# Patient Record
Sex: MALE | Race: WHITE | NOT HISPANIC OR LATINO | Employment: FULL TIME | URBAN - NONMETROPOLITAN AREA
[De-identification: names, ages, dates, MRNs, and addresses within clinical notes are randomized per-mention and may not be internally consistent; named-entity substitution may affect disease eponyms.]

---

## 2017-11-14 ENCOUNTER — HISTORY (OUTPATIENT)
Dept: EMERGENCY MEDICINE | Facility: OTHER | Age: 22
End: 2017-11-14

## 2017-12-06 ENCOUNTER — HISTORY (OUTPATIENT)
Dept: EMERGENCY MEDICINE | Facility: OTHER | Age: 22
End: 2017-12-06

## 2017-12-14 ENCOUNTER — HISTORY (OUTPATIENT)
Dept: EMERGENCY MEDICINE | Facility: OTHER | Age: 22
End: 2017-12-14

## 2018-03-01 ENCOUNTER — DOCUMENTATION ONLY (OUTPATIENT)
Dept: FAMILY MEDICINE | Facility: OTHER | Age: 23
End: 2018-03-01

## 2018-03-01 RX ORDER — POLYMYXIN B SULFATE AND TRIMETHOPRIM 1; 10000 MG/ML; [USP'U]/ML
1 SOLUTION OPHTHALMIC EVERY 4 HOURS
COMMUNITY
Start: 2017-12-14 | End: 2018-09-26

## 2018-09-26 ENCOUNTER — OFFICE VISIT (OUTPATIENT)
Dept: FAMILY MEDICINE | Facility: OTHER | Age: 23
End: 2018-09-26
Attending: FAMILY MEDICINE
Payer: COMMERCIAL

## 2018-09-26 VITALS
DIASTOLIC BLOOD PRESSURE: 70 MMHG | HEART RATE: 60 BPM | HEIGHT: 71 IN | SYSTOLIC BLOOD PRESSURE: 132 MMHG | BODY MASS INDEX: 36.26 KG/M2 | RESPIRATION RATE: 16 BRPM | WEIGHT: 259 LBS

## 2018-09-26 DIAGNOSIS — N52.8 OTHER MALE ERECTILE DYSFUNCTION: ICD-10-CM

## 2018-09-26 DIAGNOSIS — F32.0 MAJOR DEPRESSIVE DISORDER, SINGLE EPISODE, MILD (H): ICD-10-CM

## 2018-09-26 DIAGNOSIS — Z00.00 ROUTINE GENERAL MEDICAL EXAMINATION AT A HEALTH CARE FACILITY: Primary | ICD-10-CM

## 2018-09-26 DIAGNOSIS — F41.1 GAD (GENERALIZED ANXIETY DISORDER): ICD-10-CM

## 2018-09-26 DIAGNOSIS — F17.200 TOBACCO USE DISORDER: ICD-10-CM

## 2018-09-26 LAB
CHOLEST SERPL-MCNC: 161 MG/DL
HDLC SERPL-MCNC: 30 MG/DL (ref 23–92)
LDLC SERPL CALC-MCNC: 109 MG/DL
NONHDLC SERPL-MCNC: 131 MG/DL
TESTOST SERPL-MCNC: 379 NG/DL (ref 175–781)
TRIGL SERPL-MCNC: 108 MG/DL

## 2018-09-26 PROCEDURE — 84403 ASSAY OF TOTAL TESTOSTERONE: CPT | Performed by: FAMILY MEDICINE

## 2018-09-26 PROCEDURE — 90686 IIV4 VACC NO PRSV 0.5 ML IM: CPT | Performed by: FAMILY MEDICINE

## 2018-09-26 PROCEDURE — 80061 LIPID PANEL: CPT | Performed by: FAMILY MEDICINE

## 2018-09-26 PROCEDURE — 36415 COLL VENOUS BLD VENIPUNCTURE: CPT | Performed by: FAMILY MEDICINE

## 2018-09-26 PROCEDURE — 99395 PREV VISIT EST AGE 18-39: CPT | Mod: 25 | Performed by: FAMILY MEDICINE

## 2018-09-26 PROCEDURE — 90471 IMMUNIZATION ADMIN: CPT | Performed by: FAMILY MEDICINE

## 2018-09-26 RX ORDER — VARENICLINE TARTRATE 1 MG/1
1 TABLET, FILM COATED ORAL 2 TIMES DAILY
Qty: 56 TABLET | Refills: 2 | Status: SHIPPED | OUTPATIENT
Start: 2018-09-26 | End: 2019-02-08

## 2018-09-26 ASSESSMENT — ANXIETY QUESTIONNAIRES
5. BEING SO RESTLESS THAT IT IS HARD TO SIT STILL: MORE THAN HALF THE DAYS
7. FEELING AFRAID AS IF SOMETHING AWFUL MIGHT HAPPEN: SEVERAL DAYS
6. BECOMING EASILY ANNOYED OR IRRITABLE: SEVERAL DAYS
2. NOT BEING ABLE TO STOP OR CONTROL WORRYING: SEVERAL DAYS
1. FEELING NERVOUS, ANXIOUS, OR ON EDGE: MORE THAN HALF THE DAYS
3. WORRYING TOO MUCH ABOUT DIFFERENT THINGS: SEVERAL DAYS
GAD7 TOTAL SCORE: 10
IF YOU CHECKED OFF ANY PROBLEMS ON THIS QUESTIONNAIRE, HOW DIFFICULT HAVE THESE PROBLEMS MADE IT FOR YOU TO DO YOUR WORK, TAKE CARE OF THINGS AT HOME, OR GET ALONG WITH OTHER PEOPLE: SOMEWHAT DIFFICULT

## 2018-09-26 ASSESSMENT — PAIN SCALES - GENERAL: PAINLEVEL: NO PAIN (0)

## 2018-09-26 ASSESSMENT — PATIENT HEALTH QUESTIONNAIRE - PHQ9: 5. POOR APPETITE OR OVEREATING: MORE THAN HALF THE DAYS

## 2018-09-26 NOTE — MR AVS SNAPSHOT
After Visit Summary   9/26/2018    Earnest Richardson    MRN: [id number]           Patient Information     Date Of Birth          1995        Visit Information        Provider Department      9/26/2018 8:45 AM Chadwick Barron MD [facility]        Today's Diagnoses     Routine general medical examination at a health care facility    -  1    Other male erectile dysfunction        NOAM (generalized anxiety disorder)        Major depressive disorder, single episode, mild (H)        Tobacco use disorder          Care Instructions      Preventive Health Recommendations  Male Ages 21 - 25     Yearly exam:             See your health care provider every year in order to  o   Review health changes.   o   Discuss preventive care.    o   Review your medicines if your doctor has prescribed any.    You should be tested each year for STDs (sexually transmitted diseases).     Talk to your provider about cholesterol testing.      If you are at risk for diabetes, you should have a diabetes test (fasting glucose).    Shots: Get a flu shot each year. Get a tetanus shot every 10 years.     Nutrition:    Eat at least 5 servings of fruits and vegetables daily.     Eat whole-grain bread, whole-wheat pasta and brown rice instead of white grains and rice.     Get adequate calcium and Vitamin D.     Lifestyle    Exercise for at least 150 minutes a week (30 minutes a day, 5 days a week). This will help you control your weight and prevent disease.     Limit alcohol to one drink per day.     No smoking.     Wear sunscreen to prevent skin cancer.     See your dentist every six months for an exam and cleaning.     [place] counselors/therapists   Telephone Hours Kids? Address   [facility]  (Many counselors) [phone] - 8-5  F 8-12 Yes [address]   [email or web address]   [facility]  (Many counselors) [phone]  Yes [address] [address]    [email or web address]   [facility]  (Many counselors) [phone]  Yes [address]  [email or web address]   [facility]  Martintoby Ca [phone]  Yes [address]   [address]  [email or web address]   [facility]  David Maegan [phone]  Yes [address]   Grisel Cifuentes [phone]   [address]  [email or web address]   Stormy Borrero [phone]   [address]   Estela Justin [phone]   [address]   Sharon Gan [phone]  Yes [address]   Rosalba Clifford [phone]   [address]    David Lee [phone]   [address]   Laly Ochoa [phone]   10   [address] [place] [address]   [email or web address]   Ashia Smith [phone]   [address] [place] [address] [address]  ([facility])  [email or web address]   [facility]  Placido Hernandez [phone]   [address] [facility]  Michele Rinne Cindy Thomas [phone]      [facility]  Ravi Malik [phone]  Yes [address] [place] [address], [address]  [email or web address]   [facility]  Francisco Churchill CNP [phone] M-F 8-5 Yes [address] [place], [place]  [email or web address]   [facility] [place] [phone]  Yes ESSIE Mcintyre  [address]  [email or web address]   [facility] [place] [phone]  Yes ESSIE Treviño  [address] [facility]  [email or web address]               Follow-ups after your visit        Future tests that were ordered for you today     Open Future Orders        Priority Expected Expires Ordered    Lipid Panel Routine  9/26/2019 9/26/2018    Testosterone, Total Routine  9/26/2019 9/26/2018            Who to contact     If you have questions or need follow up information about today's clinic visit or your schedule please contact [facility] directly at [phone].  Normal or non-critical lab and imaging results will be communicated to you by MyChart, letter or phone within 4 business days after the clinic has received the results. If you do not hear from us within 7 days, please contact the clinic through MyChart or phone. If you have a critical or abnormal lab result, we will notify you by phone as soon as possible.  Submit refill requests through [employer] or call your pharmacy and they will forward the refill request to us. Please allow 3 business days for your refill to be completed.          Additional Information About Your Visit        Care EveryWhere ID     This is your Care EveryWhere ID. This could be used by other organizations to access your [facility] medical records  [id number]        Your Vitals Were     Pulse Respirations Height BMI (Body Mass Index)          60 16 5' 11\" (1.803 m) 36.12 kg/m2         Blood Pressure from Last 3 Encounters:   09/26/18 132/70    Weight from Last 3 Encounters:   09/26/18 259 lb (117.5 kg)              We Performed the Following     [facility]   FLU VAC PRESRV FREE QUAD SPLIT VIR 3+YRS IM          Today's Medication Changes          These changes are accurate as of 9/26/18  9:34 AM.  If you have any questions, ask your nurse or doctor.               Start taking these medicines.        Dose/Directions    * varenicline 0.5 MG X 11 & 1 MG X 42 tablet   Commonly known as:  CHANTIX STARTING MONTH FELICIANO   Used for:  Tobacco use disorder   Started by:  Chadwick Barron MD        Take 0.5 mg tab daily for 3 days, then 0.5 mg tab twice daily for 4 days, then 1 mg twice daily.   Quantity:  53 tablet   Refills:  0       * varenicline 1 MG tablet   Commonly known as:  CHANTIX   Used for:  Tobacco use disorder   Started by:  Chadwick Barron MD        Dose:  1 mg   Take 1 tablet (1 mg) by mouth 2 times daily   Quantity:  56 tablet   Refills:  2       * Notice:  This list has 2 medication(s) that are the " same as other medications prescribed for you. Read the directions carefully, and ask your doctor or other care provider to review them with you.         Where to get your medicines      These medications were sent to [id number] - [place], [place] - [address] OF  [address], [facility] [address]     Phone:  [phone]     varenicline 0.5 MG X 11 & 1 MG X 42 tablet    varenicline 1 MG tablet                Primary Care Provider Fax #    Physician No Ref-Primary [phone]       No address on file        Equal Access to Services     [facility]: Hadii aad ku hadasho Soomaali, waaxda luqadaha, qaybta kaalmada aderoberto, kuldeep madrigal . So [facility] [phone].    ATENCIÓN: Si habla español, tiene a collins disposición servicios gratuitos de asistencia lingüística. [facility] [phone].    We comply with applicable federal civil rights laws and [place] laws. We do not discriminate on the basis of race, color, national origin, age, disability, sex, sexual orientation, or gender identity.            Thank you!     Thank you for choosing [facility]  for your care. Our goal is always to provide you with excellent care. Hearing back from our patients is one way we can continue to improve our services. Please take a few minutes to complete the written survey that you may receive in the mail after your visit with us. Thank you!             Your Updated Medication List - Protect others around you: Learn how to safely use, store and throw away your medicines at www.disposemymeds.org.          This list is accurate as of 9/26/18  9:34 AM.  Always use your most recent med list.                   Brand Name Dispense Instructions for use Diagnosis    * varenicline 0.5 MG X 11 & 1 MG X 42 tablet    CHANTIX STARTING MONTH FELICIANO    53 tablet    Take 0.5 mg tab daily for 3 days, then 0.5 mg tab twice daily for 4 days, then 1 mg twice daily.     Tobacco use disorder       * varenicline 1 MG tablet    CHANTIX    56 tablet    Take 1 tablet (1 mg) by mouth 2 times daily    Tobacco use disorder       * Notice:  This list has 2 medication(s) that are the same as other medications prescribed for you. Read the directions carefully, and ask your doctor or other care provider to review them with you.

## 2018-09-26 NOTE — PATIENT INSTRUCTIONS
Preventive Health Recommendations  Male Ages 21 - 25     Yearly exam:             See your health care provider every year in order to  o   Review health changes.   o   Discuss preventive care.    o   Review your medicines if your doctor has prescribed any.    You should be tested each year for STDs (sexually transmitted diseases).     Talk to your provider about cholesterol testing.      If you are at risk for diabetes, you should have a diabetes test (fasting glucose).    Shots: Get a flu shot each year. Get a tetanus shot every 10 years.     Nutrition:    Eat at least 5 servings of fruits and vegetables daily.     Eat whole-grain bread, whole-wheat pasta and brown rice instead of white grains and rice.     Get adequate calcium and Vitamin D.     Lifestyle    Exercise for at least 150 minutes a week (30 minutes a day, 5 days a week). This will help you control your weight and prevent disease.     Limit alcohol to one drink per day.     No smoking.     Wear sunscreen to prevent skin cancer.     See your dentist every six months for an exam and cleaning.     [place] counselors/therapists   Telephone Hours Kids? Address   [facility]  (Many counselors) [phone] M-Th 8-5  F 8-12 Yes [address] [place] [address]   [email or web address]   [facility]  (Many counselors) [phone]  Yes [address]   [email or web address]://[email or web address]   [facility]  (Many counselors) [phone]  Yes [facility] [place]  [email or web address]://[email or web address]   [facility]  Martintoby Ca [phone]  Yes [address]   [address]  [email or web address]   [facility]  David Issa [phone]  Yes [address]   Grisel Cifuentes [phone]   [address]  [email or web address]   Stormy Borrero [phone]   [address] [phone]   [address] [place] [address]   Sharon Malik [phone]  Yes [address]    Bibimesfin Darrin [phone]   [address] [facility]   David Howard [phone]   [address] [facility] [address]   Laly Ochoa [phone]   10   [address] [place] [address]   [email or web address]   Ashia Smith [phone]   [address] [place] [address] [address]  ([facility])  [email or web address]   [facility]  Placido Hernandez [phone]   [address] [place] [address]  Michele Rinne Cindy Thomas [phone]      [facility]  Ravi Malik [phone]  Yes [address] [place] [address], [address]  [email or web address]   [facility]  Francisco Churchill CNP [phone] M-F 8-5 Yes [address] [place], [place]  [email or web address]   [facility]: [place] Javier [phone]  Yes ESSIE Mcintyre  [address]  [email or web address]://[email or web address]   [facility] [place] [phone]  Yes ESSIE Treviño  [address]  [email or web address]
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
## 2018-09-26 NOTE — LETTER
September 26, 2018      Earnest Richardson  [address]  [facility] [address]        Dear Earnest,     This all looks normal.    Results for orders placed or performed in visit on 09/26/18   Lipid Panel   Result Value Ref Range    Cholesterol 161 <200 mg/dL    Triglycerides 108 <150 mg/dL    HDL Cholesterol 30 23 - 92 mg/dL    LDL Cholesterol Calculated 109 (H) <100 mg/dL    Non HDL Cholesterol 131 (H) <130 mg/dL   Testosterone, Total   Result Value Ref Range    Testosterone Total 379 175 - 781 ng/dL           Sincerely,        Chadwick Barron MD

## 2018-09-26 NOTE — PROGRESS NOTES
Injectable Influenza Immunization Documentation    1.  Is the person to be vaccinated sick today?   No    2. Does the person to be vaccinated have an allergy to a component   of the vaccine?   No  Egg Allergy Algorithm Link    3. Has the person to be vaccinated ever had a serious reaction   to influenza vaccine in the past?   No    4. Has the person to be vaccinated ever had Guillain-Barré syndrome?   No    Form completed by Lexus Calvert LPN on 9/26/2018 at 9:33 AM  VERIFIED

## 2018-09-27 ASSESSMENT — PATIENT HEALTH QUESTIONNAIRE - PHQ9: SUM OF ALL RESPONSES TO PHQ QUESTIONS 1-9: 12

## 2018-09-27 ASSESSMENT — ANXIETY QUESTIONNAIRES: GAD7 TOTAL SCORE: 10

## 2019-02-08 ENCOUNTER — HOSPITAL ENCOUNTER (OUTPATIENT)
Dept: GENERAL RADIOLOGY | Facility: OTHER | Age: 24
Discharge: HOME | End: 2019-02-08
Attending: NURSE PRACTITIONER | Admitting: NURSE PRACTITIONER
Payer: OTHER MISCELLANEOUS

## 2019-02-08 ENCOUNTER — OFFICE VISIT (OUTPATIENT)
Dept: FAMILY MEDICINE | Facility: OTHER | Age: 24
End: 2019-02-08
Attending: NURSE PRACTITIONER
Payer: OTHER MISCELLANEOUS

## 2019-02-08 VITALS
WEIGHT: 274.25 LBS | BODY MASS INDEX: 38.4 KG/M2 | DIASTOLIC BLOOD PRESSURE: 90 MMHG | HEIGHT: 71 IN | SYSTOLIC BLOOD PRESSURE: 138 MMHG | RESPIRATION RATE: 16 BRPM | HEART RATE: 76 BPM

## 2019-02-08 DIAGNOSIS — S69.92XA INJURY OF FINGER OF LEFT HAND, INITIAL ENCOUNTER: ICD-10-CM

## 2019-02-08 DIAGNOSIS — S62.665B OPEN NONDISPLACED FRACTURE OF DISTAL PHALANX OF LEFT RING FINGER, INITIAL ENCOUNTER: Primary | ICD-10-CM

## 2019-02-08 PROCEDURE — 73140 X-RAY EXAM OF FINGER(S): CPT | Mod: LT

## 2019-02-08 PROCEDURE — 99214 OFFICE O/P EST MOD 30 MIN: CPT | Performed by: NURSE PRACTITIONER

## 2019-02-08 RX ORDER — CEPHALEXIN 500 MG/1
500 CAPSULE ORAL 3 TIMES DAILY
Qty: 30 CAPSULE | Refills: 0 | Status: SHIPPED | OUTPATIENT
Start: 2019-02-08 | End: 2019-03-26

## 2019-02-08 ASSESSMENT — PAIN SCALES - GENERAL: PAINLEVEL: MODERATE PAIN (5)

## 2019-02-08 ASSESSMENT — MIFFLIN-ST. JEOR: SCORE: 2256.12

## 2019-02-08 NOTE — PATIENT INSTRUCTIONS
Keflex 3 times daily x10 days  Ice to finger  Elevation of finger  Ibuprofen or tylenol as needed  Finger splint

## 2019-02-08 NOTE — PROGRESS NOTES
"HPI:    Earnest Richardson is a 23 year old male who presents to clinic today for finger injury. He smashed his left 4th finger last Sunday, 2/3/2019 and again today, 2/8/2019. This happened at work. Reports he hit his finger while breaking tacks off a log last week. Used ice and the pain improved. Today he was lifting a bottom tank and lost his . He tried to set this down and his finger was pinched underneath the tank. This was the left 4th finger. Has had swelling, pain. Placed bandage on and came in to the clinic.       Current Outpatient Medications   Medication Sig Dispense Refill     cephALEXin (KEFLEX) 500 MG capsule Take 1 capsule (500 mg) by mouth 3 times daily for 10 days 30 capsule 0       Allergies   Allergen Reactions     Penicillins Unknown       ROS:  Pertinent positives and negatives are noted in HPI.    EXAM:  /90 (BP Location: Right arm, Patient Position: Sitting, Cuff Size: Adult Regular)   Pulse 76   Resp 16   Ht 1.803 m (5' 11\")   Wt 124.4 kg (274 lb 4 oz)   BMI 38.25 kg/m    General appearance: well appearing female, in no acute distress  Musculoskeletal: left 4th finger with swelling to distal tip. Slightly limited ROM due to swelling.   Dermatological: left 4th finger with small abrasion to palmar side. Nail with subunguial hematoma present. Area is red and swollen   Psychological: normal affect, alert and pleasant  Xray: xray independently reviewed and fracture noted to distal tuft appreciated; pending radiology over-read    PROCEDURE:  XR FINGER LT G/E 2 VW     HISTORY: Injury of finger of left hand, initial encounter.     COMPARISON:  None.     TECHNIQUE:  3 views left fourth digit.     FINDINGS:  The distal tuft of the distal phalanx left fourth digit is  fractured, with mild comminution. No retained foreign body is seen. No  other fracture is identified. The joint spaces are preserved.      ROMEO SILVESTRE MD  ASSESSMENT AND PLAN:    1. Open nondisplaced fracture of distal " phalanx of left ring finger, initial encounter    2. Injury of finger of left hand, initial encounter      Due to abrasion to palmar side of finger this is considered an open fracture. He was placed on keflex preventatively. We discussed relieving the pressure under his nail bed today, he does decline at this time. Plan to treat with finger splint, NSAID's, elevation and ice. He will remain off work until Monday when he has follow-up with Dr Marc. All questions were answered and he is in agreement with plan.         Nori Soria..................2/8/2019 2:53 PM

## 2019-02-08 NOTE — NURSING NOTE
Patient presents to clinic today for left finger injury. He states he first smashed it last Sunday and now again today.     Declines PHQ and NOAM    No LMP for male patient.  Medication Reconciliation: complete    Jojo Beltran LPN  2/8/2019 2:56 PM

## 2019-02-11 ENCOUNTER — OFFICE VISIT (OUTPATIENT)
Dept: FAMILY MEDICINE | Facility: OTHER | Age: 24
End: 2019-02-11
Attending: CHIROPRACTOR
Payer: OTHER MISCELLANEOUS

## 2019-02-11 VITALS
SYSTOLIC BLOOD PRESSURE: 130 MMHG | HEIGHT: 71 IN | RESPIRATION RATE: 12 BRPM | DIASTOLIC BLOOD PRESSURE: 88 MMHG | WEIGHT: 280 LBS | TEMPERATURE: 98.9 F | HEART RATE: 80 BPM | BODY MASS INDEX: 39.2 KG/M2

## 2019-02-11 DIAGNOSIS — S62.665B OPEN NONDISPLACED FRACTURE OF DISTAL PHALANX OF LEFT RING FINGER, INITIAL ENCOUNTER: Primary | ICD-10-CM

## 2019-02-11 PROCEDURE — 99213 OFFICE O/P EST LOW 20 MIN: CPT | Performed by: CHIROPRACTOR

## 2019-02-11 ASSESSMENT — MIFFLIN-ST. JEOR: SCORE: 2282.2

## 2019-02-11 ASSESSMENT — PAIN SCALES - GENERAL: PAINLEVEL: NO PAIN (0)

## 2019-02-11 NOTE — NURSING NOTE
Patient presenting with a work comp injury. He fractured the distal phalanx of the left ring finger on 2/8/19.  No LMP for male patient.  Medication Reconciliation: complete    Review Of Systems  Skin: negative, positive for healing laceration on left ring finger  Eyes: negative  Ears/Nose/Throat: negative  Respiratory: No shortness of breath, dyspnea on exertion, cough, or hemoptysis  Cardiovascular: negative  Gastrointestinal: negative  Genitourinary: negative  Musculoskeletal: positive for  Fracture as above  Neurologic: negative  Psychiatric: negative  Hematologic/Lymphatic/Immunologic: negative  Endocrine: negative  Lakesha Gan LPN 2/11/2019 4:21 PM

## 2019-02-12 NOTE — PROGRESS NOTES
"CHIEF COMPLAINT: Earnest Richardson is a 23 year old  male  Chief Complaint   Patient presents with     Work Comp     left hand finger fracture       HISTORY OF PRESENTING INJURY     This is a new patient being seen for distal tuft fracture of the distal phalanx of the left fourth digit.  This is work related with two noted incidences occurring on February 3, 2019 and then again on February 8, 2019.  Patient sustained a strike/smash injury to the distal phalanx of the left fourth digit while \"breaking tacks off of a log\"and then again on the eighth while pinching his finger underneath a large tank.  He was fitted with full finger splint but has found this to not be wearable while performing his job duties as a .  He therefore is no longer wearing that splint.  He denies any pain today and has no problem performing his work duties other than when he bumps the injured finger and then feels pain.  He is accompanied by his wife today.        PAST MEDICAL HISTORY:  Past Medical History:   Diagnosis Date     NOAM (generalized anxiety disorder) 9/26/2018     Major depressive disorder, single episode, mild (H) 9/26/2018       PAST SURGICAL HISTORY:  Past Surgical History:   Procedure Laterality Date     NO HISTORY OF SURGERY         ALLERGIES:  Allergies   Allergen Reactions     Penicillins Unknown       CURRENT MEDICATIONS:  Current Outpatient Medications   Medication Sig Dispense Refill     cephALEXin (KEFLEX) 500 MG capsule Take 1 capsule (500 mg) by mouth 3 times daily for 10 days 30 capsule 0       SOCIAL HISTORY:  Social History     Socioeconomic History     Marital status: Single     Spouse name: Not on file     Number of children: Not on file     Years of education: Not on file     Highest education level: Not on file   Social Needs     Financial resource strain: Not on file     Food insecurity - worry: Not on file     Food insecurity - inability: Not on file     Transportation needs - medical: Not on file     " "Transportation needs - non-medical: Not on file   Occupational History     Not on file   Tobacco Use     Smoking status: Current Some Day Smoker     Packs/day: 0.50     Smokeless tobacco: Never Used   Substance and Sexual Activity     Alcohol use: Yes     Comment: Alcoholic Drinks/day: rarely     Drug use: Unknown     Types: Other     Comment: Drug use: No     Sexual activity: Not on file   Other Topics Concern     Not on file   Social History Narrative     Not on file       FAMILY HISTORY:  No family history on file.    REVIEW OF SYSTEMS:    Nursing Notes:   Lakesha Gan LPN  2/11/2019  4:55 PM  Signed  Patient presenting with a work comp injury. He fractured the distal phalanx of the left ring finger on 2/8/19.  No LMP for male patient.  Medication Reconciliation: complete    Review Of Systems  Skin: negative, positive for healing laceration on left ring finger  Eyes: negative  Ears/Nose/Throat: negative  Respiratory: No shortness of breath, dyspnea on exertion, cough, or hemoptysis  Cardiovascular: negative  Gastrointestinal: negative  Genitourinary: negative  Musculoskeletal: positive for  Fracture as above  Neurologic: negative  Psychiatric: negative  Hematologic/Lymphatic/Immunologic: negative  Endocrine: negative  Lakesha Gan LPN 2/11/2019 4:21 PM     PHYSICAL EXAM:   /88 (BP Location: Right arm, Patient Position: Sitting, Cuff Size: Adult Large)   Pulse 80   Temp 98.9  F (37.2  C) (Tympanic)   Resp 12   Ht 1.803 m (5' 11\")   Wt 127 kg (280 lb)   BMI 39.05 kg/m   Body mass index is 39.05 kg/m .    General Appearance: No acute distress.    The fourth digit of the left hand shows edema occurring at the DIP joint.  Nailbed protrusion is noticed.  Nonpurulent. He has full range of motion on each of the fingers of the left hand.   strength is unaffected.     Radiographic images were independently reviewed and discussed with the patient.       PROCEDURE:  XR FINGER LT G/E 2 " VW     HISTORY: Injury of finger of left hand, initial encounter.     COMPARISON:  None.     TECHNIQUE:  3 views left fourth digit.     FINDINGS:  The distal tuft of the distal phalanx left fourth digit is  fractured, with mild comminution. No retained foreign body is seen. No  other fracture is identified. The joint spaces are preserved.      ROMEO SILVESTRE MD      IMPRESSION:    Distal tuft fracture of the distal phalanx of the left  digit    PLAN:    Fitted patient with finger open nail stack splint any indicated this felt much better.  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  Encouraged him to keep the splint on to avoid any unnecessary contact that would create pain and/or limit healing.  Also encouraged him to keep the area clean.  Patient denies any work restrictions and we will follow-up with him in 6 weeks with x-ray to ensure proper healing.    Greater than 50% of this encounter was spent in counseling and coordination of care regarding the above condition.        Vadim Marc DC  Director - Occupational Medicine Department  [facility] and [address] [address]  Phone [phone]  Fax [phone]    Disclaimer:  This note consists of words and symbols derived from keyboarding, dictation, or using voice recognition software. As a result, there may be errors in the script that have gone undetected. Please consider this when interpreting information found in this note.    7:36 AM 2/12/2019

## 2019-03-26 ENCOUNTER — OFFICE VISIT (OUTPATIENT)
Dept: FAMILY MEDICINE | Facility: OTHER | Age: 24
End: 2019-03-26
Attending: CHIROPRACTOR
Payer: OTHER MISCELLANEOUS

## 2019-03-26 ENCOUNTER — HOSPITAL ENCOUNTER (OUTPATIENT)
Dept: GENERAL RADIOLOGY | Facility: OTHER | Age: 24
Discharge: HOME | End: 2019-03-26
Attending: CHIROPRACTOR | Admitting: CHIROPRACTOR
Payer: OTHER MISCELLANEOUS

## 2019-03-26 VITALS
WEIGHT: 282.8 LBS | SYSTOLIC BLOOD PRESSURE: 128 MMHG | TEMPERATURE: 97.8 F | HEART RATE: 72 BPM | HEIGHT: 71 IN | RESPIRATION RATE: 16 BRPM | BODY MASS INDEX: 39.59 KG/M2 | DIASTOLIC BLOOD PRESSURE: 88 MMHG

## 2019-03-26 DIAGNOSIS — S62.665B: ICD-10-CM

## 2019-03-26 DIAGNOSIS — S62.665B: Primary | ICD-10-CM

## 2019-03-26 PROCEDURE — 99213 OFFICE O/P EST LOW 20 MIN: CPT | Performed by: CHIROPRACTOR

## 2019-03-26 PROCEDURE — 73140 X-RAY EXAM OF FINGER(S): CPT | Mod: LT

## 2019-03-26 ASSESSMENT — MIFFLIN-ST. JEOR: SCORE: 2294.9

## 2019-03-26 ASSESSMENT — PAIN SCALES - GENERAL: PAINLEVEL: NO PAIN (0)

## 2019-03-26 NOTE — NURSING NOTE
"Patient presents to the clinic today for work comp.  Nina Schuster LPN 3/26/2019   4:41 PM    Chief Complaint   Patient presents with     Work Comp       Initial /88 (BP Location: Right arm, Patient Position: Sitting, Cuff Size: Adult Regular)   Pulse 72   Temp 97.8  F (36.6  C) (Oral)   Resp 16   Ht 1.803 m (5' 11\")   Wt 128.3 kg (282 lb 12.8 oz)   BMI 39.44 kg/m   Estimated body mass index is 39.44 kg/m  as calculated from the following:    Height as of this encounter: 1.803 m (5' 11\").    Weight as of this encounter: 128.3 kg (282 lb 12.8 oz).  Medication Reconciliation: complete     Nina Schuster LPN    "

## 2019-03-27 NOTE — PROGRESS NOTES
CHIEF COMPLAINT: Earnest Richardson is a 23 year old  male  Chief Complaint   Patient presents with     Work Comp       HISTORY OF PRESENTING INJURY     Earnest returns today for re-evaluation of his left 4th digit fracture.  He reports no pain or limitation while performing his work duties.  He has been wearing the protective splint up to lately when he states it no longer fits due to the decrease in finger swelling.  He is interested in having a smaller splint today.  He has no major complaints and thinks his finger has healed appropriately.      PAST MEDICAL HISTORY:  Past Medical History:   Diagnosis Date     NOAM (generalized anxiety disorder) 9/26/2018     Major depressive disorder, single episode, mild (H) 9/26/2018       PAST SURGICAL HISTORY:  Past Surgical History:   Procedure Laterality Date     NO HISTORY OF SURGERY         ALLERGIES:  Allergies   Allergen Reactions     Penicillins Unknown       CURRENT MEDICATIONS:  No current outpatient medications on file.       SOCIAL HISTORY:  Social History     Socioeconomic History     Marital status: Single     Spouse name: Not on file     Number of children: Not on file     Years of education: Not on file     Highest education level: Not on file   Occupational History     Not on file   Social Needs     Financial resource strain: Not on file     Food insecurity:     Worry: Not on file     Inability: Not on file     Transportation needs:     Medical: Not on file     Non-medical: Not on file   Tobacco Use     Smoking status: Current Some Day Smoker     Packs/day: 0.50     Smokeless tobacco: Never Used   Substance and Sexual Activity     Alcohol use: Yes     Comment: Alcoholic Drinks/day: rarely     Drug use: Yes     Types: Marijuana     Sexual activity: Yes     Partners: Female   Lifestyle     Physical activity:     Days per week: Not on file     Minutes per session: Not on file     Stress: Not on file   Relationships     Social connections:     Talks on phone: Not on  "file     Gets together: Not on file     Attends Taoist service: Not on file     Active member of club or organization: Not on file     Attends meetings of clubs or organizations: Not on file     Relationship status: Not on file     Intimate partner violence:     Fear of current or ex partner: Not on file     Emotionally abused: Not on file     Physically abused: Not on file     Forced sexual activity: Not on file   Other Topics Concern     Not on file   Social History Narrative     Not on file       FAMILY HISTORY:  History reviewed. No pertinent family history.    REVIEW OF SYSTEMS:    No change from 2/11/2019 visit    PHYSICAL EXAM:   /88 (BP Location: Right arm, Patient Position: Sitting, Cuff Size: Adult Regular)   Pulse 72   Temp 97.8  F (36.6  C) (Oral)   Resp 16   Ht 1.803 m (5' 11\")   Wt 128.3 kg (282 lb 12.8 oz)   BMI 39.44 kg/m   Body mass index is 39.44 kg/m .    General Appearance: No acute distress.    Updated x-rays were obtained.     XR Finger Left G/E 2 Views   Order: [id number]   Status:  Final result   Visible to patient:  No (Not Released) Dx:  Open nondisplaced fracture of distal ...   Details     Reading Physician Reading Date Result Priority   Romeo Silvestre MD 3/26/2019       Narrative     PROCEDURE:  XR FINGER LT G/E 2 VW    HISTORY: Open nondisplaced fracture of distal phalanx of left ring  finger.    COMPARISON:  2/8/2019    TECHNIQUE:  3 views left fourth finger.    FINDINGS:  The previously described comminuted fracture of the distal  tuft of the left fourth finger is redemonstrated, not significantly  changed in appearance. No other fracture is seen. No retained foreign  body is identified.     ROMEO SILVESTRE MD             Radiographic images were independently reviewed and discussed with the patient.        IMPRESSION:    Unresolved distal tuft fracture of the left 4th digit    PLAN:    (1) follow up with orthopaedic consult   (2) full duty workability " authorized  (3) fitted new stack splint to wear as needed  (4) follow up as needed.    Greater than 50% of this encounter was spent in counseling and coordination of care regarding the above condition.        Vadim Marc DC  Director - Occupational Medicine Department  [address] [address]  Phone [phone]  Fax [phone]    Disclaimer:  This note consists of words and symbols derived from keyboarding, dictation, or using voice recognition software. As a result, there may be errors in the script that have gone undetected. Please consider this when interpreting information found in this note.    9:00 AM 3/27/2019

## 2020-06-23 ENCOUNTER — HOSPITAL ENCOUNTER (EMERGENCY)
Facility: OTHER | Age: 25
Discharge: HOME | End: 2020-06-23
Attending: PHYSICIAN ASSISTANT | Admitting: PHYSICIAN ASSISTANT
Payer: COMMERCIAL

## 2020-06-23 VITALS
OXYGEN SATURATION: 99 % | RESPIRATION RATE: 18 BRPM | WEIGHT: 300 LBS | HEIGHT: 71 IN | DIASTOLIC BLOOD PRESSURE: 96 MMHG | TEMPERATURE: 98.8 F | BODY MASS INDEX: 42 KG/M2 | SYSTOLIC BLOOD PRESSURE: 109 MMHG | HEART RATE: 95 BPM

## 2020-06-23 DIAGNOSIS — L02.31 ABSCESS OF GLUTEAL CLEFT: ICD-10-CM

## 2020-06-23 LAB
GRAM STN SPEC: ABNORMAL
SPECIMEN SOURCE: ABNORMAL

## 2020-06-23 PROCEDURE — 87070 CULTURE OTHR SPECIMN AEROBIC: CPT | Performed by: PHYSICIAN ASSISTANT

## 2020-06-23 PROCEDURE — 10060 I&D ABSCESS SIMPLE/SINGLE: CPT | Performed by: PHYSICIAN ASSISTANT

## 2020-06-23 PROCEDURE — 99283 EMERGENCY DEPT VISIT LOW MDM: CPT | Mod: 25 | Performed by: PHYSICIAN ASSISTANT

## 2020-06-23 PROCEDURE — 87205 SMEAR GRAM STAIN: CPT | Performed by: PHYSICIAN ASSISTANT

## 2020-06-23 PROCEDURE — 10060 I&D ABSCESS SIMPLE/SINGLE: CPT | Mod: Z6 | Performed by: PHYSICIAN ASSISTANT

## 2020-06-23 PROCEDURE — 99283 EMERGENCY DEPT VISIT LOW MDM: CPT | Mod: Z6 | Performed by: PHYSICIAN ASSISTANT

## 2020-06-23 RX ORDER — SULFAMETHOXAZOLE/TRIMETHOPRIM 800-160 MG
1 TABLET ORAL 2 TIMES DAILY
Qty: 20 TABLET | Refills: 0 | Status: SHIPPED | OUTPATIENT
Start: 2020-06-23 | End: 2020-07-20

## 2020-06-23 RX ORDER — LIDOCAINE HYDROCHLORIDE AND EPINEPHRINE 10; 10 MG/ML; UG/ML
20 INJECTION, SOLUTION INFILTRATION; PERINEURAL ONCE
Status: DISCONTINUED | OUTPATIENT
Start: 2020-06-23 | End: 2020-06-23 | Stop reason: HOSPADM

## 2020-06-23 ASSESSMENT — ENCOUNTER SYMPTOMS
CHILLS: 0
HEMATURIA: 0
FEVER: 0
WOUND: 1
CONFUSION: 0
BRUISES/BLEEDS EASILY: 0
ADENOPATHY: 0
COLOR CHANGE: 1
SHORTNESS OF BREATH: 0
ABDOMINAL PAIN: 0
CHEST TIGHTNESS: 0
BACK PAIN: 0

## 2020-06-23 ASSESSMENT — MIFFLIN-ST. JEOR: SCORE: 2362.92

## 2020-06-23 NOTE — ED TRIAGE NOTES
Patient complaining of painful cyst on upper buttock that started on Sunday.  Patient unable to sit comfortably in chair or able to go to work due to pain.

## 2020-06-23 NOTE — ED AVS SNAPSHOT
[facility]  [address]  [place] [address]  Phone:  [phone]  Fax:  [phone]                                    Earnest Richardson   MRN: [id number]    Department:  [facility]   Date of Visit:  6/23/2020           After Visit Summary Signature Page    I have received my discharge instructions, and my questions have been answered. I have discussed any challenges I see with this plan with the nurse or doctor.    ..........................................................................................................................................  Patient/Patient Representative Signature      ..........................................................................................................................................  Patient Representative Print Name and Relationship to Patient    ..................................................               ................................................  Date                                   Time    ..........................................................................................................................................  Reviewed by Signature/Title    ...................................................              ..............................................  Date                                               Time          22EPIC Rev 08/18

## 2020-06-23 NOTE — LETTER
June 23, 2020      To Whom It May Concern:      Earnest Richardson was seen in our Emergency Department today, 06/23/20.  I expect his condition to improve over the next 3 days.  He may return to work when improved.    Sincerely,        DEISY Boswell

## 2020-06-23 NOTE — DISCHARGE INSTRUCTIONS
Get plenty of fluids and rest.  Keep the area clean at least twice per day with soap and water and reapply bandage.  Take your antibiotics as directed.  You can also take Tylenol and ibuprofen to help with discomfort.  Referral has been placed for you to follow-up with surgery for reassessment.  Return to the ED if there are worsening or concerning symptoms.

## 2020-06-23 NOTE — ED PROVIDER NOTES
History      Chief Complaint   Patient presents with     Cyst     HPI  Earnest Richardson is a 25 year old male who presents to the ED for evaluation of a cyst on his buttock. He reports noticing the area approximately 2-3 days ago and it has continued to grow and has become more painful. He has had a similar lesion in the past that seemed to drain and clear up on its own. He denies any fevers, difficulty urinating or with bowel movements. He is otherwise healthy and not immunosuppressed.     Allergies:  Allergies   Allergen Reactions     Penicillins Unknown       Problem List:    Patient Active Problem List    Diagnosis Date Noted     Other male erectile dysfunction 09/26/2018     Priority: Medium     NOAM (generalized anxiety disorder) 09/26/2018     Priority: Medium     Major depressive disorder, single episode, mild (H) 09/26/2018     Priority: Medium     Tobacco use disorder 09/26/2018     Priority: Medium     BMI (body mass index), pediatric, > 99% for age 01/17/2012     Priority: Medium     Headache 01/17/2012     Priority: Medium     Overview:   Updated per 10/1/17 IMO import       Oppositional defiant disorder 05/26/2010     Priority: Medium     Overview:   IMO Update 10/11       Dysthymic disorder 04/20/2010     Priority: Medium     Attention deficit hyperactivity disorder (ADHD) 04/17/2008     Priority: Medium     Overview:   IMO Update 10 2016          Past Medical History:    Past Medical History:   Diagnosis Date     NOAM (generalized anxiety disorder) 9/26/2018     Major depressive disorder, single episode, mild (H) 9/26/2018       Past Surgical History:    Past Surgical History:   Procedure Laterality Date     NO HISTORY OF SURGERY         Family History:    History reviewed. No pertinent family history.    Social History:  Marital Status:  Single [1]  Social History     Tobacco Use     Smoking status: Current Some Day Smoker     Packs/day: 0.50     Smokeless tobacco: Never Used   Substance Use Topics  "    Alcohol use: Yes     Comment: Alcoholic Drinks/day: rarely     Drug use: Yes     Types: Marijuana        Medications:    sulfamethoxazole-trimethoprim (BACTRIM DS) 800-160 MG tablet          Review of Systems   Constitutional: Negative for chills and fever.   HENT: Negative for congestion.    Eyes: Negative for visual disturbance.   Respiratory: Negative for chest tightness and shortness of breath.    Cardiovascular: Negative for chest pain.   Gastrointestinal: Negative for abdominal pain.   Genitourinary: Negative for hematuria.   Musculoskeletal: Negative for back pain.   Skin: Positive for color change and wound. Negative for rash.   Neurological: Negative for syncope.   Hematological: Negative for adenopathy. Does not bruise/bleed easily.   Psychiatric/Behavioral: Negative for confusion.       Physical Exam   BP: (!) 158/98  Pulse: 95  Heart Rate: 91  Temp: 97.5  F (36.4  C)  Resp: 18  Height: 180.3 cm (5' 11\")  Weight: 136.1 kg (300 lb)  SpO2: 97 %      Physical Exam  Constitutional:       General: He is not in acute distress.     Appearance: He is well-developed. He is not diaphoretic.   HENT:      Head: Normocephalic and atraumatic.   Eyes:      General: No scleral icterus.     Conjunctiva/sclera: Conjunctivae normal.   Neck:      Musculoskeletal: Neck supple.   Cardiovascular:      Rate and Rhythm: Normal rate and regular rhythm.   Pulmonary:      Effort: Pulmonary effort is normal.      Breath sounds: Normal breath sounds.   Abdominal:      Palpations: Abdomen is soft.      Tenderness: There is no abdominal tenderness.   Musculoskeletal:         General: No deformity.   Lymphadenopathy:      Cervical: No cervical adenopathy.   Skin:     General: Skin is warm and dry.      Findings: Erythema present. No rash.      Comments: Indurated/fluctuant and erythematous area to right side of superior gluteal cleft. Approximately 5 cm in diameter.   Neurological:      Mental Status: He is alert and oriented to " person, place, and time. Mental status is at baseline.   Psychiatric:         Mood and Affect: Mood normal.         Behavior: Behavior normal.         Thought Content: Thought content normal.         Judgment: Judgment normal.         ED Course        [facility]    PROCEDURE: -Incision/Drainage    Date/Time: 6/23/2020 11:56 AM  Performed by: Anthony Robin PA  Authorized by: Anthony Robin PA       LOCATION:      Type:  Abscess    Size:  5 cm     Location: right superior gluteal cleft.    PRE-PROCEDURE DETAILS:     Skin preparation:  Chloraprep    ANESTHESIA (see MAR for exact dosages):     Anesthesia method:  Local infiltration    Local anesthetic:  Lidocaine 1% WITH epi    PROCEDURE TYPE:     Complexity:  Simple    PROCEDURE DETAILS:     Needle aspiration: no      Incision types:  Single straight    Incision depth:  Subcutaneous    Scalpel blade:  11    Wound management:  Irrigated with saline and probed and deloculated    Drainage:  Bloody and purulent    Drainage amount:  Moderate    Wound treatment:  Wound left open    Packing materials:  None  Post-procedure details:     PROCEDURE   Patient Tolerance:  Patient tolerated the procedure well with no immediate complications                     Critical Care time:  none               No results found for this or any previous visit (from the past 24 hour(s)).    Medications   lidocaine 1% with EPINEPHrine 1:100,000 injection 20 mL (has no administration in time range)       Assessments & Plan (with Medical Decision Making)   Pt nontoxic in NAD. Heart, lung, bowel sounds normal, abd soft, nontender to palpation, nondistended. VSS, afebrile    He does have Indurated/fluctuant and erythematous area to right side of superior gluteal cleft. Approximately 5 cm in diameter.    An I & D was performed, there was a moderate amount of purulent drainage. Wound Culture was obtained. The pt is allergic to penicillins and so we will start him on  Bactrim and he will f/u with surgery in the clinic this week for reassessment.     Strict return precautions are given to the pt, they will return if symptoms are worsening or concerning. The pt understands and agrees with the plan and they are discharged.     Anthony Robin PA-C        I have reviewed the nursing notes.    I have reviewed the findings, diagnosis, plan and need for follow up with the patient.       Discharge Medication List as of 6/23/2020 11:53 AM      START taking these medications    Details   sulfamethoxazole-trimethoprim (BACTRIM DS) 800-160 MG tablet Take 1 tablet by mouth 2 times daily for 10 days, Disp-20 tablet,R-0, E-Prescribe             Final diagnoses:   Abscess of gluteal cleft       6/23/2020   [facility]     Anthony Robin PA  06/23/20 [id number]

## 2020-06-25 LAB
BACTERIA SPEC CULT: NORMAL
SPECIMEN SOURCE: NORMAL

## 2020-06-25 NOTE — RESULT ENCOUNTER NOTE
Final wound culture report is NEGATIVE.    No treatment or change in treatment per [place] ED Lab Result protocol.

## 2020-06-26 ENCOUNTER — OFFICE VISIT (OUTPATIENT)
Dept: SURGERY | Facility: OTHER | Age: 25
End: 2020-06-26
Attending: SURGERY
Payer: COMMERCIAL

## 2020-06-26 VITALS
WEIGHT: 300.8 LBS | BODY MASS INDEX: 41.95 KG/M2 | TEMPERATURE: 97.9 F | SYSTOLIC BLOOD PRESSURE: 128 MMHG | HEART RATE: 88 BPM | DIASTOLIC BLOOD PRESSURE: 90 MMHG | RESPIRATION RATE: 20 BRPM

## 2020-06-26 DIAGNOSIS — L05.91 CHRONIC RECURRENT PILONIDAL CYST WITHOUT ABSCESS: Primary | ICD-10-CM

## 2020-06-26 PROCEDURE — 99204 OFFICE O/P NEW MOD 45 MIN: CPT | Performed by: SURGERY

## 2020-06-26 RX ORDER — ACETAMINOPHEN 325 MG/1
975 TABLET ORAL ONCE
Status: CANCELLED | OUTPATIENT
Start: 2020-06-26 | End: 2020-06-26

## 2020-06-26 RX ORDER — CLINDAMYCIN PHOSPHATE 900 MG/50ML
900 INJECTION, SOLUTION INTRAVENOUS SEE ADMIN INSTRUCTIONS
Status: CANCELLED | OUTPATIENT
Start: 2020-06-26

## 2020-06-26 RX ORDER — CLINDAMYCIN PHOSPHATE 900 MG/50ML
900 INJECTION, SOLUTION INTRAVENOUS
Status: CANCELLED | OUTPATIENT
Start: 2020-06-26

## 2020-06-26 NOTE — H&P (VIEW-ONLY)
GENERAL SURGERY CONSULTATION NOTE    Earnest Richardson   PO   [facility] [address]  25 year old  male    Primary Care Provider:  No Ref-Primary, Physician      HPI: Earnest Richardson presents to clinic with recurrent swelling and infection at the gluteal cleft.  Patient states he recently underwent incision and drainage of pilonidal abscess.  He is now taking Bactrim and states the drainage has dried up and the area feels better.  This happened once before and the area spontaneously drained on its own and he was not seen by medical professional.  However, this time the area did not drain spontaneously and required incision and drainage.  Patient states he has cats at home but otherwise showers regularly.  He does not trim his back hair, or hair on his buttocks.  No previous history of infection anywhere else.  Patient is not immunocompromised.      REVIEW OF SYSTEMS:    GENERAL: No fevers or chills. Denies fatigue, recent weight loss.  HEENT: No sinus drainage. No changes with vision or hearing. No difficulty swallowing.   LYMPHATICS:  Noswollen nodes in axilla, neck or groin.  CARDIOVASCULAR: Denies chest pain, palpitations and dyspnea on exertion.  PULMONARY: No shortness of breath or cough. No increase in sputum production.  GI: Denies melena,bright red blood in stools. No hematemesis. No constipation or diarrhea.  : No dysuria or hematuria.  SKIN: No recent rashes or ulcers.   HEMATOLOGY:  No history of easy bruising or bleeding.  ENDOCRINE:  No history of diabetes or thyroid problems.  NEUROLOGY:  No history of seizures or headaches. No motor or sensory changes.        Patient Active Problem List   Diagnosis     Other male erectile dysfunction     NOAM (generalized anxiety disorder)     Major depressive disorder, single episode, mild (H)     Tobacco use disorder     Attention deficit hyperactivity disorder (ADHD)     BMI (body mass index), pediatric, > 99% for age     Dysthymic disorder     Headache      Oppositional defiant disorder     Chronic recurrent pilonidal cyst without abscess       Past Medical History:   Diagnosis Date     NOAM (generalized anxiety disorder) 9/26/2018     Major depressive disorder, single episode, mild (H) 9/26/2018       Past Surgical History:   Procedure Laterality Date     NO HISTORY OF SURGERY         No family history on file.    Social History     Social History Narrative     Not on file       Social History     Socioeconomic History     Marital status: Single     Spouse name: Not on file     Number of children: Not on file     Years of education: Not on file     Highest education level: Not on file   Occupational History     Not on file   Social Needs     Financial resource strain: Not on file     Food insecurity     Worry: Not on file     Inability: Not on file     Transportation needs     Medical: Not on file     Non-medical: Not on file   Tobacco Use     Smoking status: Current Some Day Smoker     Packs/day: 0.50     Smokeless tobacco: Never Used   Substance and Sexual Activity     Alcohol use: Yes     Comment: Alcoholic Drinks/day: rarely     Drug use: Yes     Types: Marijuana     Sexual activity: Yes     Partners: Female   Lifestyle     Physical activity     Days per week: Not on file     Minutes per session: Not on file     Stress: Not on file   Relationships     Social connections     Talks on phone: Not on file     Gets together: Not on file     Attends Sikhism service: Not on file     Active member of club or organization: Not on file     Attends meetings of clubs or organizations: Not on file     Relationship status: Not on file     Intimate partner violence     Fear of current or ex partner: Not on file     Emotionally abused: Not on file     Physically abused: Not on file     Forced sexual activity: Not on file   Other Topics Concern     Not on file   Social History Narrative     Not on file       sulfamethoxazole-trimethoprim (BACTRIM DS) 800-160 MG tablet, Take 1  tablet by mouth 2 times daily for 10 days    No current facility-administered medications on file prior to visit.         ALLERGIES/SENSITIVITIES:   Allergies   Allergen Reactions     Penicillins Unknown       PHYSICAL EXAM:     BP (!) 128/90 (BP Location: Left arm, Patient Position: Sitting, Cuff Size: Adult Large)   Pulse 88   Temp 97.9  F (36.6  C) (Tympanic)   Resp 20   Wt 136.4 kg (300 lb 12.8 oz)   BMI 41.95 kg/m      General Appearance:   Sitting up in the chair, no apparent distress  HEENT: Pupils are equal and reactive, no scleral icterus,   Heart & CV:  RRR no murmur.  Intact distal pulses, good cap refill.  LUNGS: No increased work of breathing.Lugns are CTA B/L, no wheezing or crackles.  Abd: Soft, nontender, nondistended.  Ext: Small, 0.3 mm punctum at the gluteal cleft.  There is no active drainage or expressible drainage.  There is mild induration in the area but no evidence of residual fluid collection.  No hair sticking out of the wound.  Neuro: Alert and oriented, normal speech mentation        CONSULTATION ASSESSMENT AND PLAN:    25 year old male with chronic, recurrent pilonidal cyst.  The area was just drained and the patient is finishing his course of Bactrim.  I would recommend the patient finish his antibiotics and we excise this area.  The technical details of pilonidal cyst excision were discussed with the patient along with the risk and benefits to include bleeding, infection, cyst recurrence.  The patient demonstrated understanding and is willing to proceed.    Schedule for pilonidal cyst excision on 7/8/2020        Jackson Bernabe MD on 6/26/2020 at 11:44 AM

## 2020-06-26 NOTE — PROGRESS NOTES
GENERAL SURGERY CONSULTATION NOTE    Earnest Richardson   PO   [facility] [address]  25 year old  male    Primary Care Provider:  No Ref-Primary, Physician      HPI: Earnest Richardson presents to clinic with recurrent swelling and infection at the gluteal cleft.  Patient states he recently underwent incision and drainage of pilonidal abscess.  He is now taking Bactrim and states the drainage has dried up and the area feels better.  This happened once before and the area spontaneously drained on its own and he was not seen by medical professional.  However, this time the area did not drain spontaneously and required incision and drainage.  Patient states he has cats at home but otherwise showers regularly.  He does not trim his back hair, or hair on his buttocks.  No previous history of infection anywhere else.  Patient is not immunocompromised.      REVIEW OF SYSTEMS:    GENERAL: No fevers or chills. Denies fatigue, recent weight loss.  HEENT: No sinus drainage. No changes with vision or hearing. No difficulty swallowing.   LYMPHATICS:  Noswollen nodes in axilla, neck or groin.  CARDIOVASCULAR: Denies chest pain, palpitations and dyspnea on exertion.  PULMONARY: No shortness of breath or cough. No increase in sputum production.  GI: Denies melena,bright red blood in stools. No hematemesis. No constipation or diarrhea.  : No dysuria or hematuria.  SKIN: No recent rashes or ulcers.   HEMATOLOGY:  No history of easy bruising or bleeding.  ENDOCRINE:  No history of diabetes or thyroid problems.  NEUROLOGY:  No history of seizures or headaches. No motor or sensory changes.        Patient Active Problem List   Diagnosis     Other male erectile dysfunction     NOAM (generalized anxiety disorder)     Major depressive disorder, single episode, mild (H)     Tobacco use disorder     Attention deficit hyperactivity disorder (ADHD)     BMI (body mass index), pediatric, > 99% for age     Dysthymic disorder     Headache      Oppositional defiant disorder     Chronic recurrent pilonidal cyst without abscess       Past Medical History:   Diagnosis Date     NOAM (generalized anxiety disorder) 9/26/2018     Major depressive disorder, single episode, mild (H) 9/26/2018       Past Surgical History:   Procedure Laterality Date     NO HISTORY OF SURGERY         No family history on file.    Social History     Social History Narrative     Not on file       Social History     Socioeconomic History     Marital status: Single     Spouse name: Not on file     Number of children: Not on file     Years of education: Not on file     Highest education level: Not on file   Occupational History     Not on file   Social Needs     Financial resource strain: Not on file     Food insecurity     Worry: Not on file     Inability: Not on file     Transportation needs     Medical: Not on file     Non-medical: Not on file   Tobacco Use     Smoking status: Current Some Day Smoker     Packs/day: 0.50     Smokeless tobacco: Never Used   Substance and Sexual Activity     Alcohol use: Yes     Comment: Alcoholic Drinks/day: rarely     Drug use: Yes     Types: Marijuana     Sexual activity: Yes     Partners: Female   Lifestyle     Physical activity     Days per week: Not on file     Minutes per session: Not on file     Stress: Not on file   Relationships     Social connections     Talks on phone: Not on file     Gets together: Not on file     Attends Sikh service: Not on file     Active member of club or organization: Not on file     Attends meetings of clubs or organizations: Not on file     Relationship status: Not on file     Intimate partner violence     Fear of current or ex partner: Not on file     Emotionally abused: Not on file     Physically abused: Not on file     Forced sexual activity: Not on file   Other Topics Concern     Not on file   Social History Narrative     Not on file       sulfamethoxazole-trimethoprim (BACTRIM DS) 800-160 MG tablet, Take 1  tablet by mouth 2 times daily for 10 days    No current facility-administered medications on file prior to visit.         ALLERGIES/SENSITIVITIES:   Allergies   Allergen Reactions     Penicillins Unknown       PHYSICAL EXAM:     BP (!) 128/90 (BP Location: Left arm, Patient Position: Sitting, Cuff Size: Adult Large)   Pulse 88   Temp 97.9  F (36.6  C) (Tympanic)   Resp 20   Wt 136.4 kg (300 lb 12.8 oz)   BMI 41.95 kg/m      General Appearance:   Sitting up in the chair, no apparent distress  HEENT: Pupils are equal and reactive, no scleral icterus,   Heart & CV:  RRR no murmur.  Intact distal pulses, good cap refill.  LUNGS: No increased work of breathing.Lugns are CTA B/L, no wheezing or crackles.  Abd: Soft, nontender, nondistended.  Ext: Small, 0.3 mm punctum at the gluteal cleft.  There is no active drainage or expressible drainage.  There is mild induration in the area but no evidence of residual fluid collection.  No hair sticking out of the wound.  Neuro: Alert and oriented, normal speech mentation        CONSULTATION ASSESSMENT AND PLAN:    25 year old male with chronic, recurrent pilonidal cyst.  The area was just drained and the patient is finishing his course of Bactrim.  I would recommend the patient finish his antibiotics and we excise this area.  The technical details of pilonidal cyst excision were discussed with the patient along with the risk and benefits to include bleeding, infection, cyst recurrence.  The patient demonstrated understanding and is willing to proceed.    Schedule for pilonidal cyst excision on 7/8/2020        Jackson Bernabe MD on 6/26/2020 at 11:44 AM

## 2020-06-26 NOTE — NURSING NOTE
"Chief Complaint   Patient presents with     Derm Problem     pilonidal cyst       Initial BP (!) 128/90 (BP Location: Left arm, Patient Position: Sitting, Cuff Size: Adult Large)   Pulse 88   Temp 97.9  F (36.6  C) (Tympanic)   Resp 20   Wt 136.4 kg (300 lb 12.8 oz)   BMI 41.95 kg/m   Estimated body mass index is 41.95 kg/m  as calculated from the following:    Height as of 6/23/20: 1.803 m (5' 11\").    Weight as of this encounter: 136.4 kg (300 lb 12.8 oz).  Medication Reconciliation: complete    Neena Booker LPN  "

## 2020-06-30 DIAGNOSIS — Z01.818 PRE-OP TESTING: Primary | ICD-10-CM

## 2020-07-02 LAB
SARS-COV-2 RNA SPEC QL NAA+PROBE: NORMAL
SPECIMEN SOURCE: NORMAL

## 2020-07-05 ENCOUNTER — ALLIED HEALTH/NURSE VISIT (OUTPATIENT)
Dept: FAMILY MEDICINE | Facility: OTHER | Age: 25
End: 2020-07-05
Attending: SURGERY
Payer: COMMERCIAL

## 2020-07-05 DIAGNOSIS — Z29.9 PROPHYLACTIC MEASURE: Primary | ICD-10-CM

## 2020-07-05 PROCEDURE — 99207 ZZC NO CHARGE NURSE ONLY: CPT

## 2020-07-05 PROCEDURE — C9803 HOPD COVID-19 SPEC COLLECT: HCPCS

## 2020-07-05 PROCEDURE — U0003 INFECTIOUS AGENT DETECTION BY NUCLEIC ACID (DNA OR RNA); SEVERE ACUTE RESPIRATORY SYNDROME CORONAVIRUS 2 (SARS-COV-2) (CORONAVIRUS DISEASE [COVID-19]), AMPLIFIED PROBE TECHNIQUE, MAKING USE OF HIGH THROUGHPUT TECHNOLOGIES AS DESCRIBED BY CMS-2020-01-R: HCPCS | Mod: ZL | Performed by: SURGERY

## 2020-07-06 LAB
SARS-COV-2 RNA SPEC QL NAA+PROBE: NOT DETECTED
SPECIMEN SOURCE: NORMAL

## 2020-07-07 ENCOUNTER — ANESTHESIA EVENT (OUTPATIENT)
Dept: SURGERY | Facility: OTHER | Age: 25
End: 2020-07-07
Payer: COMMERCIAL

## 2020-07-08 ENCOUNTER — HOSPITAL ENCOUNTER (OUTPATIENT)
Facility: OTHER | Age: 25
Discharge: HOME | End: 2020-07-08
Attending: SURGERY | Admitting: SURGERY
Payer: COMMERCIAL

## 2020-07-08 ENCOUNTER — ANESTHESIA (OUTPATIENT)
Dept: SURGERY | Facility: OTHER | Age: 25
End: 2020-07-08
Payer: COMMERCIAL

## 2020-07-08 VITALS
TEMPERATURE: 97.7 F | HEART RATE: 96 BPM | OXYGEN SATURATION: 95 % | RESPIRATION RATE: 13 BRPM | DIASTOLIC BLOOD PRESSURE: 72 MMHG | SYSTOLIC BLOOD PRESSURE: 112 MMHG

## 2020-07-08 DIAGNOSIS — L05.91 CHRONIC RECURRENT PILONIDAL CYST WITHOUT ABSCESS: ICD-10-CM

## 2020-07-08 DIAGNOSIS — Z98.890 POSTOPERATIVE STATE: Primary | ICD-10-CM

## 2020-07-08 PROCEDURE — 71000027 ZZH RECOVERY PHASE 2 EACH 15 MINS: Performed by: SURGERY

## 2020-07-08 PROCEDURE — 25000128 H RX IP 250 OP 636: Performed by: NURSE ANESTHETIST, CERTIFIED REGISTERED

## 2020-07-08 PROCEDURE — 25000125 ZZHC RX 250: Performed by: SURGERY

## 2020-07-08 PROCEDURE — 36000050 ZZH SURGERY LEVEL 2 1ST 30 MIN: Performed by: SURGERY

## 2020-07-08 PROCEDURE — 27210794 ZZH OR GENERAL SUPPLY STERILE: Performed by: SURGERY

## 2020-07-08 PROCEDURE — 25000132 ZZH RX MED GY IP 250 OP 250 PS 637: Performed by: SURGERY

## 2020-07-08 PROCEDURE — 25000125 ZZHC RX 250: Performed by: NURSE ANESTHETIST, CERTIFIED REGISTERED

## 2020-07-08 PROCEDURE — 88304 TISSUE EXAM BY PATHOLOGIST: CPT

## 2020-07-08 PROCEDURE — 11770 EXC PILONIDAL CYST SIMPLE: CPT | Performed by: NURSE ANESTHETIST, CERTIFIED REGISTERED

## 2020-07-08 PROCEDURE — 37000009 ZZH ANESTHESIA TECHNICAL FEE, EACH ADDTL 15 MIN: Performed by: SURGERY

## 2020-07-08 PROCEDURE — 25000564 ZZH DESFLURANE, EA 15 MIN: Performed by: SURGERY

## 2020-07-08 PROCEDURE — 37000008 ZZH ANESTHESIA TECHNICAL FEE, 1ST 30 MIN: Performed by: SURGERY

## 2020-07-08 PROCEDURE — 36000052 ZZH SURGERY LEVEL 2 EA 15 ADDTL MIN: Performed by: SURGERY

## 2020-07-08 PROCEDURE — 40000306 ZZH STATISTIC PRE PROC ASSESS II: Performed by: SURGERY

## 2020-07-08 PROCEDURE — 25800030 ZZH RX IP 258 OP 636: Performed by: NURSE ANESTHETIST, CERTIFIED REGISTERED

## 2020-07-08 PROCEDURE — 71000014 ZZH RECOVERY PHASE 1 LEVEL 2 FIRST HR: Performed by: SURGERY

## 2020-07-08 PROCEDURE — 11770 EXC PILONIDAL CYST SIMPLE: CPT | Performed by: SURGERY

## 2020-07-08 RX ORDER — CLINDAMYCIN PHOSPHATE 900 MG/50ML
900 INJECTION, SOLUTION INTRAVENOUS SEE ADMIN INSTRUCTIONS
Status: DISCONTINUED | OUTPATIENT
Start: 2020-07-08 | End: 2020-07-08 | Stop reason: HOSPADM

## 2020-07-08 RX ORDER — ONDANSETRON 4 MG/1
4 TABLET, ORALLY DISINTEGRATING ORAL
Status: DISCONTINUED | OUTPATIENT
Start: 2020-07-08 | End: 2020-07-08 | Stop reason: HOSPADM

## 2020-07-08 RX ORDER — ACETAMINOPHEN 325 MG/1
650 TABLET ORAL
Status: DISCONTINUED | OUTPATIENT
Start: 2020-07-08 | End: 2020-07-08 | Stop reason: HOSPADM

## 2020-07-08 RX ORDER — PROPOFOL 10 MG/ML
INJECTION, EMULSION INTRAVENOUS PRN
Status: DISCONTINUED | OUTPATIENT
Start: 2020-07-08 | End: 2020-07-08

## 2020-07-08 RX ORDER — KETAMINE HYDROCHLORIDE 10 MG/ML
INJECTION INTRAMUSCULAR; INTRAVENOUS PRN
Status: DISCONTINUED | OUTPATIENT
Start: 2020-07-08 | End: 2020-07-08

## 2020-07-08 RX ORDER — ONDANSETRON 2 MG/ML
4 INJECTION INTRAMUSCULAR; INTRAVENOUS EVERY 30 MIN PRN
Status: DISCONTINUED | OUTPATIENT
Start: 2020-07-08 | End: 2020-07-08 | Stop reason: HOSPADM

## 2020-07-08 RX ORDER — BUPIVACAINE HYDROCHLORIDE AND EPINEPHRINE 5; 5 MG/ML; UG/ML
INJECTION, SOLUTION EPIDURAL; INTRACAUDAL; PERINEURAL PRN
Status: DISCONTINUED | OUTPATIENT
Start: 2020-07-08 | End: 2020-07-08 | Stop reason: HOSPADM

## 2020-07-08 RX ORDER — KETOROLAC TROMETHAMINE 30 MG/ML
INJECTION, SOLUTION INTRAMUSCULAR; INTRAVENOUS PRN
Status: DISCONTINUED | OUTPATIENT
Start: 2020-07-08 | End: 2020-07-08

## 2020-07-08 RX ORDER — MEPERIDINE HYDROCHLORIDE 50 MG/ML
12.5 INJECTION INTRAMUSCULAR; INTRAVENOUS; SUBCUTANEOUS
Status: DISCONTINUED | OUTPATIENT
Start: 2020-07-08 | End: 2020-07-08 | Stop reason: HOSPADM

## 2020-07-08 RX ORDER — SODIUM CHLORIDE, SODIUM LACTATE, POTASSIUM CHLORIDE, CALCIUM CHLORIDE 600; 310; 30; 20 MG/100ML; MG/100ML; MG/100ML; MG/100ML
INJECTION, SOLUTION INTRAVENOUS CONTINUOUS
Status: DISCONTINUED | OUTPATIENT
Start: 2020-07-08 | End: 2020-07-08 | Stop reason: HOSPADM

## 2020-07-08 RX ORDER — ACETAMINOPHEN 325 MG/1
975 TABLET ORAL ONCE
Status: COMPLETED | OUTPATIENT
Start: 2020-07-08 | End: 2020-07-08

## 2020-07-08 RX ORDER — CLINDAMYCIN PHOSPHATE 900 MG/50ML
900 INJECTION, SOLUTION INTRAVENOUS
Status: DISCONTINUED | OUTPATIENT
Start: 2020-07-08 | End: 2020-07-08 | Stop reason: HOSPADM

## 2020-07-08 RX ORDER — NALOXONE HYDROCHLORIDE 0.4 MG/ML
.1-.4 INJECTION, SOLUTION INTRAMUSCULAR; INTRAVENOUS; SUBCUTANEOUS
Status: DISCONTINUED | OUTPATIENT
Start: 2020-07-08 | End: 2020-07-08 | Stop reason: HOSPADM

## 2020-07-08 RX ORDER — LIDOCAINE 40 MG/G
CREAM TOPICAL
Status: DISCONTINUED | OUTPATIENT
Start: 2020-07-08 | End: 2020-07-08 | Stop reason: HOSPADM

## 2020-07-08 RX ORDER — ONDANSETRON 4 MG/1
4 TABLET, ORALLY DISINTEGRATING ORAL EVERY 30 MIN PRN
Status: DISCONTINUED | OUTPATIENT
Start: 2020-07-08 | End: 2020-07-08 | Stop reason: HOSPADM

## 2020-07-08 RX ORDER — MAGNESIUM HYDROXIDE 1200 MG/15ML
LIQUID ORAL PRN
Status: DISCONTINUED | OUTPATIENT
Start: 2020-07-08 | End: 2020-07-08 | Stop reason: HOSPADM

## 2020-07-08 RX ORDER — ONDANSETRON 2 MG/ML
INJECTION INTRAMUSCULAR; INTRAVENOUS PRN
Status: DISCONTINUED | OUTPATIENT
Start: 2020-07-08 | End: 2020-07-08

## 2020-07-08 RX ORDER — FENTANYL CITRATE 50 UG/ML
INJECTION, SOLUTION INTRAMUSCULAR; INTRAVENOUS PRN
Status: DISCONTINUED | OUTPATIENT
Start: 2020-07-08 | End: 2020-07-08

## 2020-07-08 RX ORDER — FENTANYL CITRATE 50 UG/ML
25-50 INJECTION, SOLUTION INTRAMUSCULAR; INTRAVENOUS EVERY 5 MIN PRN
Status: DISCONTINUED | OUTPATIENT
Start: 2020-07-08 | End: 2020-07-08 | Stop reason: HOSPADM

## 2020-07-08 RX ORDER — OXYCODONE AND ACETAMINOPHEN 5; 325 MG/1; MG/1
1 TABLET ORAL
Status: DISCONTINUED | OUTPATIENT
Start: 2020-07-08 | End: 2020-07-08 | Stop reason: HOSPADM

## 2020-07-08 RX ORDER — OXYCODONE AND ACETAMINOPHEN 5; 325 MG/1; MG/1
1-2 TABLET ORAL EVERY 4 HOURS PRN
Qty: 6 TABLET | Refills: 0 | Status: SHIPPED | OUTPATIENT
Start: 2020-07-08 | End: 2020-12-10

## 2020-07-08 RX ORDER — HYDROMORPHONE HYDROCHLORIDE 1 MG/ML
.3-.5 INJECTION, SOLUTION INTRAMUSCULAR; INTRAVENOUS; SUBCUTANEOUS EVERY 10 MIN PRN
Status: DISCONTINUED | OUTPATIENT
Start: 2020-07-08 | End: 2020-07-08 | Stop reason: HOSPADM

## 2020-07-08 RX ORDER — DEXAMETHASONE SODIUM PHOSPHATE 4 MG/ML
INJECTION, SOLUTION INTRA-ARTICULAR; INTRALESIONAL; INTRAMUSCULAR; INTRAVENOUS; SOFT TISSUE PRN
Status: DISCONTINUED | OUTPATIENT
Start: 2020-07-08 | End: 2020-07-08

## 2020-07-08 RX ADMIN — ROCURONIUM BROMIDE 5 MG: 10 INJECTION INTRAVENOUS at 07:40

## 2020-07-08 RX ADMIN — ROCURONIUM BROMIDE 10 MG: 10 INJECTION INTRAVENOUS at 07:50

## 2020-07-08 RX ADMIN — ACETAMINOPHEN 975 MG: 325 TABLET, FILM COATED ORAL at 07:01

## 2020-07-08 RX ADMIN — KETOROLAC TROMETHAMINE 30 MG: 30 INJECTION, SOLUTION INTRAMUSCULAR at 07:57

## 2020-07-08 RX ADMIN — ROCURONIUM BROMIDE 35 MG: 10 INJECTION INTRAVENOUS at 07:45

## 2020-07-08 RX ADMIN — PROPOFOL 200 MG: 10 INJECTION, EMULSION INTRAVENOUS at 07:40

## 2020-07-08 RX ADMIN — CLINDAMYCIN IN 5 PERCENT DEXTROSE 900 MG: 18 INJECTION, SOLUTION INTRAVENOUS at 07:46

## 2020-07-08 RX ADMIN — SUCCINYLCHOLINE CHLORIDE 160 MG: 20 INJECTION, SOLUTION INTRAMUSCULAR; INTRAVENOUS; PARENTERAL at 07:40

## 2020-07-08 RX ADMIN — LIDOCAINE HYDROCHLORIDE 0.1 ML: 10 INJECTION, SOLUTION EPIDURAL; INFILTRATION; INTRACAUDAL; PERINEURAL at 07:31

## 2020-07-08 RX ADMIN — DEXAMETHASONE SODIUM PHOSPHATE 4 MG: 4 INJECTION, SOLUTION INTRA-ARTICULAR; INTRALESIONAL; INTRAMUSCULAR; INTRAVENOUS; SOFT TISSUE at 07:50

## 2020-07-08 RX ADMIN — FENTANYL CITRATE 100 MCG: 50 INJECTION, SOLUTION INTRAMUSCULAR; INTRAVENOUS at 07:37

## 2020-07-08 RX ADMIN — SUGAMMADEX 300 MG: 100 INJECTION, SOLUTION INTRAVENOUS at 08:15

## 2020-07-08 RX ADMIN — Medication 50 MG: at 07:40

## 2020-07-08 RX ADMIN — ONDANSETRON 4 MG: 2 INJECTION INTRAMUSCULAR; INTRAVENOUS at 07:40

## 2020-07-08 RX ADMIN — SODIUM CHLORIDE, POTASSIUM CHLORIDE, SODIUM LACTATE AND CALCIUM CHLORIDE: 600; 310; 30; 20 INJECTION, SOLUTION INTRAVENOUS at 07:31

## 2020-07-08 RX ADMIN — MIDAZOLAM 2 MG: 1 INJECTION INTRAMUSCULAR; INTRAVENOUS at 07:37

## 2020-07-08 ASSESSMENT — LIFESTYLE VARIABLES: TOBACCO_USE: 1

## 2020-07-08 NOTE — OP NOTE
PREOPERATIVE  DIAGNOSIS: pilonidal cyst      POSTOPERATIVE DIAGNOSIS:pilonidal cyst     PROCEDURE PERFORMED:  pilonidal cyst excision with primary repair     SURGEON:  Jackson Bernabe MD     ASSISTANT: Circulator: Rom Limon RN; Vanessa Rouse RN  Scrub Person: Susanne Smith  First Assistant: Mary Soria RN  Pre-Op Nurse: Dora Wild RN    ANESTHESIA: GeneralCRNA Independent: Isaiah Darnell APRN CRNA    FAMILYPHYSICIAN: No Ref-Primary, Physician      INDICATION FOR THE PROCEDURE:  The patient is a 25 year old y.o. male with a pilonidal cyst.  The patient was explained risks and benefits of the procedure including but no limited to bleeding, possible infection, and recurrence.         PROCEDURE:  Earnest Richardson was brought to the operating room placed in supine position.  general anesthesia was applied and the patient was intubated. The patient was rolled to prone position. The patient was prepped and draped in the usual sterile fashion. Time out was performed and everyone was in agreement to proceed. The site of previous drainage was visualized on the patient's right buttock, just off midline. The area was infiltrated with 0.5% bupivacaine with epinephrine. The incision was probed, but the skin had healed completely and no opening remained. An elliptical incision was made to encompass the area, 3.5 x 2.0 cm. The subcutaneous tissues were dissected with electrocautery. The cyst cavity was entered as the dissection proceeded medially. The cyst cavity measured ~1.5 x 1.0 x 1.0cm and was filled with clear fluid. No hair was found. The cyst cavity was completely excised. The wound was irrigated with 200mL normal saline and bleeding was controlled with electrocautery. The deep tissues were reapproximated with 0-vicryl suture. The dermis was reapproximated with interrupted 3-0 Vicryl sutures.  Skin was closed with a inverted interrupted 4-0 Monocryl suture.  The skin was cleansed and  dried. mastasol and steri strips were used to buttress the wound and the wound was covered with a sterile dressing.  At the end of the case all sponge and needle counts were correct.  The patient tolerated procedure well and was taken to the recovery room in good condition.        KEVIN Bernabe MD

## 2020-07-08 NOTE — OR NURSING
PACU Transfer Note    Earnest Richardson was transferred to DSU via cart.  Equipment used for transport:  none.  Accompanied by:  Sharon QUARLES  Prescriptions were: faxed to [facility] pharmacy and hard copy in chart    PACU Respiratory Event Documentation     1) Episodes of Apnea greater than or equal to 10 seconds: 0    2) Bradypnea - less than 8 breaths per minute: 0    3) Pain score on 0 to 10 scale: 0    4) Pain-sedation mismatch (yes or no): no    5) Repeated 02 desaturation less than 90% (yes or no): no    Anesthesia notified? (yes or no): n/a    Any of the above events occuring repeatedly in separate 30 minute intervals may be considered recurrent PACU respiratory events.    Patient stable and meets phase 1 discharge criteria for transport from PACU.

## 2020-07-08 NOTE — ANESTHESIA CARE TRANSFER NOTE
Patient: Earnest Richardson    Procedure(s):  EXCISION, PILONIDAL CYST    Diagnosis: Chronic recurrent pilonidal cyst without abscess [L05.91]  Diagnosis Additional Information: No value filed.    Anesthesia Type:   General     Note:  Airway :Face Mask  Patient transferred to:PACU  Handoff Report: Identifed the Patient, Identified the Reponsible Provider, Reviewed the pertinent medical history, Discussed the surgical course, Reviewed Intra-OP anesthesia mangement and issues during anesthesia, Set expectations for post-procedure period and Allowed opportunity for questions and acknowledgement of understanding      Vitals: (Last set prior to Anesthesia Care Transfer)    CRNA VITALS  7/8/2020 0751 - 7/8/2020 0827      7/8/2020             Pulse:  94    Ht Rate:  94    SpO2:  93 %    Resp Rate (observed):  (!) 5    Resp Rate (set):  10                Electronically Signed By: MITCH Bills CRNA  July 8, 2020  8:27 AM

## 2020-07-08 NOTE — ANESTHESIA POSTPROCEDURE EVALUATION
Patient: Earnest Richardson    Procedure(s):  EXCISION, PILONIDAL CYST    Diagnosis:Chronic recurrent pilonidal cyst without abscess [L05.91]  Diagnosis Additional Information: No value filed.    Anesthesia Type:  General    Note:  Anesthesia Post Evaluation    Patient location during evaluation: PACU  Patient participation: Able to fully participate in evaluation  Level of consciousness: awake and alert  Pain management: adequate  Airway patency: patent  Cardiovascular status: acceptable  Respiratory status: acceptable  Hydration status: acceptable  PONV: none     Anesthetic complications: None          Last vitals:  Vitals:    07/08/20 0839 07/08/20 0840 07/08/20 0847   BP:  135/78 126/69   Pulse:  89 79   Resp:  15    Temp: 98.2  F (36.8  C)  97.7  F (36.5  C)   SpO2:  100%          Electronically Signed By: MITCH Bills CRNA  July 8, 2020  9:07 AM

## 2020-07-08 NOTE — ANESTHESIA PREPROCEDURE EVALUATION
Anesthesia Pre-Procedure Evaluation    Patient: Earnest Richardson   MRN: [id number] : 1995          Preoperative Diagnosis: Chronic recurrent pilonidal cyst without abscess [L05.91]    Procedure(s):  EXCISION, PILONIDAL CYST    Past Medical History:   Diagnosis Date     NOAM (generalized anxiety disorder) 2018     Major depressive disorder, single episode, mild (H) 2018     Past Surgical History:   Procedure Laterality Date     NO HISTORY OF SURGERY         Anesthesia Evaluation     . Pt has not had prior anesthetic            ROS/MED HX    ENT/Pulmonary:     (+)JONELLE risk factors obese, tobacco use, Current use 1 packs/day  , . .    Neurologic:  - neg neurologic ROS     Cardiovascular:  - neg cardiovascular ROS       METS/Exercise Tolerance:  >4 METS   Hematologic:  - neg hematologic  ROS       Musculoskeletal:  - neg musculoskeletal ROS       GI/Hepatic:     (+) GERD Symptomatic,       Renal/Genitourinary:  - ROS Renal section negative       Endo:     (+) Obesity, .      Psychiatric:     (+) psychiatric history anxiety      Infectious Disease:  - neg infectious disease ROS       Malignancy:      - no malignancy   Other:    - neg other ROS                      Physical Exam  Normal systems: cardiovascular and dental    Airway   Mallampati: II  TM distance: >3 FB  Neck ROM: full    Dental     Cardiovascular   Rhythm and rate: regular and normal      Pulmonary    breath sounds clear to auscultation            No results found for: WBC, HGB, HCT, PLT, CRP, SED, NA, POTASSIUM, CHLORIDE, CO2, BUN, CR, GLC, ROSA, PHOS, MAG, ALBUMIN, PROTTOTAL, ALT, AST, GGT, ALKPHOS, BILITOTAL, BILIDIRECT, LIPASE, AMYLASE, DOLORES, PTT, INR, FIBR, TSH, T4, T3, HCG, HCGS, CKTOTAL, CKMB, TROPN    Preop Vitals  BP Readings from Last 3 Encounters:   20 129/81   20 (!) 128/90   20 (!) 109/96    Pulse Readings from Last 3 Encounters:   20 88   20 95   19 72      Resp Readings from Last 3 Encounters:  "  07/08/20 16   06/26/20 20   06/23/20 18    SpO2 Readings from Last 3 Encounters:   07/08/20 98%   06/23/20 99%      Temp Readings from Last 1 Encounters:   07/08/20 98  F (36.7  C) (Tympanic)    Ht Readings from Last 1 Encounters:   06/23/20 1.803 m (5' 11\")      Wt Readings from Last 1 Encounters:   06/26/20 136.4 kg (300 lb 12.8 oz)    Estimated body mass index is 41.95 kg/m  as calculated from the following:    Height as of 6/23/20: 1.803 m (5' 11\").    Weight as of 6/26/20: 136.4 kg (300 lb 12.8 oz).       Anesthesia Plan      History & Physical Review      ASA Status:  2 .    NPO Status:  > 6 hours    Plan for General with Propofol induction. Maintenance will be Balanced.    PONV prophylaxis:  Ondansetron (or other 5HT-3) and Dexamethasone or Solumedrol         Postoperative Care  Postoperative pain management:  IV analgesics and Multi-modal analgesia.      Consents  Anesthetic plan, risks, benefits and alternatives discussed with:  Patient..                 MITCH RONQUILLO CRNA  "

## 2020-07-08 NOTE — ANESTHESIA PROCEDURE NOTES
Airway   Date/Time: 7/8/2020 7:41 AM   Patient location during procedure: OR    General Information and Staff   Performed: CRNA         Indications and Patient Condition  Indications for airway management: cassi-procedural  Induction type:intravenous  Mask difficulty assessment: no attempt    Final Airway Details  Final airway type: endotracheal airway  Successful airway:ETT  ETT size (mm): 8.0 Cuffed: yes   Cuff volume (mL): 10  Blade: [place]  Blade size: #2  Successful intubation technique: asleep and direct  Endotracheal tube insertion site: right side of mouth   Measured from: teeth  ETT to teeth (cm): 25  Grade View of Cords: 2Number of attempts at approach: 1    Secured with:tape  Ease of procedure: easy  Dentition: Intact and Unchanged

## 2020-07-08 NOTE — INTERVAL H&P NOTE
I saw and examined Earnest Richardson.  I have reviewed the history and physical and find no changes to the patient's medical status or condition with the exceptions noted below.     Jackson Bernabe MD   7:14 AM 7/8/2020

## 2020-07-08 NOTE — DISCHARGE INSTRUCTIONS
[facility]  Adult Discharge Orders & Instructions      For 24 hours after surgery:  1. Get plenty of rest.  A responsible adult must stay with you for at least 24 hours after you leave the hospital.   2. You may feel lightheaded.  IF so, sit for a few minutes before standing.  Have someone help you get up.   3. You may have a slight fever. Call the doctor if your fever is over 101 F (38.3 C) (taken under the tongue) or lasts longer than 24 hours.  4. You may have a dry mouth, a sore throat, muscle aches or trouble sleeping.  These should go away after 24 hours.  5. Do not make important or legal decisions.  6.   Do not drive or use heavy equipment.  If you have weakness or tingling, don't drive or use heavy equipment until this feeling goes away.                                                                                                                                                                           To contact a doctor, call    [phone]______________

## 2020-07-08 NOTE — OR NURSING
Patient has been discharged to home at 0945 via ambulatory accompanied by his brother.    Written discharge instructions were provided to patient.  Prescriptions were filled at [facility] pharmacy and picked up by patient. He states his pain is tolerable and denies any nausea or dizziness upon discharge.     Patient and adult caring for them verbalize understanding of discharge instructions including no driving until tomorrow and no longer taking narcotic pain medications - no operating mechanical equipment and no making any important decisions.They understand reason for discharge, and necessary follow-up appointments.      Mely Wilkes RN

## 2020-07-20 ENCOUNTER — OFFICE VISIT (OUTPATIENT)
Dept: SURGERY | Facility: OTHER | Age: 25
End: 2020-07-20
Attending: SURGERY
Payer: COMMERCIAL

## 2020-07-20 VITALS
WEIGHT: 300 LBS | SYSTOLIC BLOOD PRESSURE: 118 MMHG | RESPIRATION RATE: 20 BRPM | BODY MASS INDEX: 41.84 KG/M2 | DIASTOLIC BLOOD PRESSURE: 84 MMHG | HEART RATE: 88 BPM | TEMPERATURE: 97.5 F

## 2020-07-20 DIAGNOSIS — Z98.890 POSTOPERATIVE STATE: Primary | ICD-10-CM

## 2020-07-20 PROCEDURE — 99024 POSTOP FOLLOW-UP VISIT: CPT | Performed by: SURGERY

## 2020-07-20 ASSESSMENT — PAIN SCALES - GENERAL: PAINLEVEL: NO PAIN (1)

## 2020-07-20 NOTE — NURSING NOTE
"Chief Complaint   Patient presents with     Surgical Followup     pilonidal cystectomy       Initial /84 (BP Location: Right arm, Patient Position: Sitting, Cuff Size: Adult Large)   Pulse 88   Temp 97.5  F (36.4  C) (Temporal)   Resp 20   Wt 136.1 kg (300 lb)   BMI 41.84 kg/m   Estimated body mass index is 41.84 kg/m  as calculated from the following:    Height as of 6/23/20: 1.803 m (5' 11\").    Weight as of this encounter: 136.1 kg (300 lb).  Medication Reconciliation: complete    Neena Booker LPN  "

## 2020-07-20 NOTE — PROGRESS NOTES
Earnest Richardson returns to clinic after undergoing pilonidal cystectomy roughly 2 weeks ago.  Patient states the wound has opened up.  It has minimal draining and does not hurt.  He denies redness or swelling or purulent drainage.  He keeps a piece of gauze in the area to catch any drainage and to keep hair out.  He washes the wound daily.  Denies fevers or chills.  Denies chest pain or shortness of breath.  Denies constipation diarrhea.      /84 (BP Location: Right arm, Patient Position: Sitting, Cuff Size: Adult Large)   Pulse 88   Temp 97.5  F (36.4  C) (Temporal)   Resp 20   Wt 136.1 kg (300 lb)   BMI 41.84 kg/m      Gluteal cleft shows roughly 2.5 cm x 1 cm open wound with clean granulation tissue at the base.  No surrounding erythema or induration    Cyst excision pathology report shows chronic inflammation and fibrosis consistent with healing pilonidal tract.      Earnest Richardson is a 25-year-old male status post pilonidal cystectomy.  Procedure is complicated by wound dehiscence.  The wound is small and clean and shows signs of healing already.  This will likely just heal up on its own in 1 to 2 weeks.  Patient states he is comfortable with the wound cares and has elected to forego further follow-up.  He was reeducated on the signs and symptoms of wound infected and told to call clinic with concerns.  He was also advised to keep the area of his buttock and backside trimmed of hair.      Jackson Bernabe MD

## 2020-12-10 ENCOUNTER — OFFICE VISIT (OUTPATIENT)
Dept: FAMILY MEDICINE | Facility: OTHER | Age: 25
End: 2020-12-10
Attending: FAMILY MEDICINE
Payer: COMMERCIAL

## 2020-12-10 ENCOUNTER — NURSE TRIAGE (OUTPATIENT)
Dept: FAMILY MEDICINE | Facility: OTHER | Age: 25
End: 2020-12-10

## 2020-12-10 VITALS
OXYGEN SATURATION: 96 % | WEIGHT: 296.8 LBS | BODY MASS INDEX: 41.4 KG/M2 | DIASTOLIC BLOOD PRESSURE: 90 MMHG | HEART RATE: 87 BPM | RESPIRATION RATE: 16 BRPM | SYSTOLIC BLOOD PRESSURE: 134 MMHG | TEMPERATURE: 98 F

## 2020-12-10 DIAGNOSIS — E66.01 MORBID OBESITY (H): Primary | ICD-10-CM

## 2020-12-10 DIAGNOSIS — R07.9 CHEST PAIN, UNSPECIFIED TYPE: ICD-10-CM

## 2020-12-10 PROCEDURE — 99213 OFFICE O/P EST LOW 20 MIN: CPT | Performed by: FAMILY MEDICINE

## 2020-12-10 ASSESSMENT — ANXIETY QUESTIONNAIRES
3. WORRYING TOO MUCH ABOUT DIFFERENT THINGS: NEARLY EVERY DAY
6. BECOMING EASILY ANNOYED OR IRRITABLE: NEARLY EVERY DAY
IF YOU CHECKED OFF ANY PROBLEMS ON THIS QUESTIONNAIRE, HOW DIFFICULT HAVE THESE PROBLEMS MADE IT FOR YOU TO DO YOUR WORK, TAKE CARE OF THINGS AT HOME, OR GET ALONG WITH OTHER PEOPLE: SOMEWHAT DIFFICULT
1. FEELING NERVOUS, ANXIOUS, OR ON EDGE: NEARLY EVERY DAY
GAD7 TOTAL SCORE: 16
5. BEING SO RESTLESS THAT IT IS HARD TO SIT STILL: NOT AT ALL
7. FEELING AFRAID AS IF SOMETHING AWFUL MIGHT HAPPEN: NEARLY EVERY DAY
2. NOT BEING ABLE TO STOP OR CONTROL WORRYING: SEVERAL DAYS

## 2020-12-10 ASSESSMENT — PATIENT HEALTH QUESTIONNAIRE - PHQ9
5. POOR APPETITE OR OVEREATING: NEARLY EVERY DAY
SUM OF ALL RESPONSES TO PHQ QUESTIONS 1-9: 23

## 2020-12-10 ASSESSMENT — PAIN SCALES - GENERAL: PAINLEVEL: NO PAIN (0)

## 2020-12-10 NOTE — TELEPHONE ENCOUNTER
"S-(situation): Pt called c/o arm/chest pain.    B-(background): Overweight (283 lb- Pt reported), Tobacco use, NOAM. Denies personal cardiac or pulmonary history. Uncle required heart transplant at age 46.    A-(assessment): Symptoms (intermittent, sometimes lasting 1 hour) starting 4 days ago: feeling like someone pressing thumb into left shoulder blade and \"weird\" sensation in far left side of chest. Pt states, \"My home life has been stressful lately and I have been running around crazy at work.\"- increased stressors. Denies: weakness, palpitations, SOB, sweating, dizziness, lightheadedness, chest \"pain\" or ratable pain that is increasing in severity or frequency, injury/strain, cough, personal history of cardiac or pulmonary issues, fever/chills, jaw/neck/leg pain, bowel or bladder changes, interference with normal activities.    R-(recommendations): Protocol unclear as pain in shoulder blade (not shoulder, arm, or jaw). Protocol does recommend Pt go to ED or to office with PCP approval, if chest pain is lasting >5 minutes and OV for intermittent chest pain persisting >3 days. Per face to face with Dr. Veras's LPN, ok to put have Pt into schedule, but Pt should come now, in case of need for cardiac work-up. Appointment scheduled for 3:40 PM.     Attempted reaching Pt, to instruct him to come now. Left message on machine to call back. Dara Liu RN .............. 12/10/2020  2:03 PM    Additional Information    Negative: Passed out (i.e., fainted, collapsed and was not responding)    Negative: Shock suspected (e.g., cold/pale/clammy skin, too weak to stand, low BP, rapid pulse)    Negative: Sounds like a life-threatening emergency to the triager    Negative: Severe difficulty breathing (e.g., struggling for each breath, speaks in single words)    Negative: Passed out (i.e., fainted, collapsed and was not responding)    Negative: Chest pain lasting longer than 5 minutes and ANY of the following:* Over 50 years " "old* Over 30 years old and at least one cardiac risk factor (i.e., high blood pressure, diabetes, high cholesterol, obesity, smoker or strong family history of heart disease)* Pain is crushing, pressure-like, or heavy * Took nitroglycerin and chest pain was not relieved* History of heart disease (i.e., angina, heart attack, bypass surgery, angioplasty, CHF)    Negative: Visible sweat on face or sweat dripping down face    Negative: Sounds like a life-threatening emergency to the triager    Negative: Followed an injury to chest    Pain in the upper back over the ribs (rib cage) that radiates (travels) into the chest    Negative: Dizziness or lightheadedness    Negative: Intermittent chest pain and pain has been increasing in severity or frequency    Negative: Coughing up blood    Negative: Patient sounds very sick or weak to the triager    Negative: Heart beating irregularly or very rapidly    Negative: Recent long-distance travel with prolonged time in car, bus, plane, or train (i.e., within past 2 weeks; 6 or more hours duration)    Negative: Major surgery in the past month    Negative: Hip or leg fracture in past 2 months (e.g, or had cast on leg or ankle)    Negative: Recent illness requiring prolonged bed rest (i.e., immobilization)    Negative: History of prior 'blood clot' in leg or lungs (i.e., deep vein thrombosis, pulmonary embolism)    Negative: Difficulty breathing    Negative: SEVERE chest pain    Negative: Fever > 100.5 F (38.1 C)    Intermittent chest pains persist > 3 days    Answer Assessment - Initial Assessment Questions  1. ONSET:  4 days ago    2. LOCATION:  Feels like someone is pressing their thumb into his left shoulder blade and \"weird\" sensation in far left side of his chest. Denies \"pain.\"    3. SEVERITY:  MILD: no ratable pain, doesn't interfere with normal activities.    4. PATTERN:   Intermittent, sometimes lasting 1 hour. Denies increasing in frequency or severity.    5. RADIATION: " "\"Does the pain shoot into your legs or elsewhere?\"  Denies    6. CAUSE:  \"What do you think is causing the back pain?\"   Unsure    7. BACK OVERUSE:  \"Any recent lifting of heavy objects, strenuous work or exercise?\"  Denies    8. NEUROLOGIC SYMPTOMS  Denies weakness, numbness, problems with bowel or bladder control    9. OTHER SYMPTOMS:  Denies: fever, abdominal pain, burning with urination, blood in urine, bowel issues, SOB, cough    Pt states, \"My home life has been stressful lately and I have been running around crazy at work.\"- increased stressors    Denies personal cardiac or pulmonary history. Uncle required heart transplant at age 46.    Protocols used: BACK PAIN-A-OH, CHEST PAIN-A-OH      "

## 2020-12-10 NOTE — TELEPHONE ENCOUNTER
2nd message left for Pt, requesting he come now, for his appointment today.    FYI to Dr. Veras's nurse. Dara Liu RN .............. 12/10/2020  2:07 PM

## 2020-12-10 NOTE — NURSING NOTE
Patient here for left shoulder blade discomfort the past 4 days. He denies chest pain. He has a weird sensation. He did score high on the anxiety and depression screens.  Medication Reconciliation: complete.    Lakesha Donohue LPN  12/10/2020 3:47 PM

## 2020-12-11 ASSESSMENT — ANXIETY QUESTIONNAIRES: GAD7 TOTAL SCORE: 16

## 2020-12-14 PROBLEM — E66.01 MORBID OBESITY (H): Status: ACTIVE | Noted: 2020-12-14

## 2020-12-14 NOTE — TELEPHONE ENCOUNTER
Noted Pt was seen in office 12/10/20. Closing encounter at this time. Dara Liu RN .............. 12/14/2020  1:00 PM

## 2020-12-14 NOTE — PROGRESS NOTES
"  SUBJECTIVE:   Earnest Richardson is a 25 year old male who presents to clinic today for the following health issues: Chest pain    Patient arrives here for left chest pain.  He states it is a \"weird sensation.\".  Reports it radiates to his left shoulder blade.  Is been going on for 4 days.  He is concerned about heart disease.  States there is a family history of heart disease.  No injury.  No fevers or chills.  Nothing seems to make it worse or better.  Started off.  Currently the pain is a 0 out of 10 there is no shortness of breath diaphoresis complaints.  Patient does report increasing pain with anxiety and decreasing pain as the stress decreases.        Patient Active Problem List    Diagnosis Date Noted     Morbid obesity (H) 12/14/2020     Priority: Medium     Chronic recurrent pilonidal cyst without abscess 06/26/2020     Priority: Medium     Added automatically from request for surgery [id number]       Other male erectile dysfunction 09/26/2018     Priority: Medium     NOAM (generalized anxiety disorder) 09/26/2018     Priority: Medium     Major depressive disorder, single episode, mild (H) 09/26/2018     Priority: Medium     Tobacco use disorder 09/26/2018     Priority: Medium     BMI (body mass index), pediatric, > 99% for age 01/17/2012     Priority: Medium     Headache 01/17/2012     Priority: Medium     Overview:   Updated per 10/1/17 IMO import       Oppositional defiant disorder 05/26/2010     Priority: Medium     Overview:   IMO Update 10/11       Dysthymic disorder 04/20/2010     Priority: Medium     Attention deficit hyperactivity disorder (ADHD) 04/17/2008     Priority: Medium     Overview:   IMO Update 10 2016       Past Medical History:   Diagnosis Date     NOAM (generalized anxiety disorder) 9/26/2018     Major depressive disorder, single episode, mild (H) 9/26/2018      Past Surgical History:   Procedure Laterality Date     CYSTECTOMY PILONIDAL N/A 7/8/2020    Procedure: EXCISION, PILONIDAL CYST;  " Surgeon: Jackson Bernabe MD;  Location: [facility] OR     EXCISE PILONIDAL CYST, SIMPLE Right 07/08/2020     NO HISTORY OF SURGERY         Review of Systems     OBJECTIVE:     BP (!) 134/90   Pulse 87   Temp 98  F (36.7  C)   Resp 16   Wt 134.6 kg (296 lb 12.8 oz)   SpO2 96%   BMI 41.40 kg/m    Body mass index is 41.4 kg/m .  Physical Exam  Constitutional:       Appearance: He is obese.   Cardiovascular:      Rate and Rhythm: Normal rate and regular rhythm.   Pulmonary:      Effort: Pulmonary effort is normal.      Breath sounds: Normal breath sounds.   Abdominal:      General: Abdomen is flat.   Skin:     General: Skin is warm.   Neurological:      Mental Status: He is alert.   Psychiatric:         Mood and Affect: Mood normal.         Diagnostic Test Results:  none     ASSESSMENT/PLAN:         1. Morbid obesity (H)  Epic required update    2. Chest pain, unspecified type  Chest pain is most consistent with a musculoskeletal versus anxiety.  Not consistent with cardiac.  Recommended observation.        Taj Veras MD  [facility] AND \[facility]\""

## 2021-01-11 ENCOUNTER — OFFICE VISIT (OUTPATIENT)
Dept: FAMILY MEDICINE | Facility: OTHER | Age: 26
End: 2021-01-11
Attending: FAMILY MEDICINE
Payer: COMMERCIAL

## 2021-01-11 ENCOUNTER — MYC MEDICAL ADVICE (OUTPATIENT)
Dept: FAMILY MEDICINE | Facility: OTHER | Age: 26
End: 2021-01-11

## 2021-01-11 VITALS
RESPIRATION RATE: 20 BRPM | DIASTOLIC BLOOD PRESSURE: 102 MMHG | BODY MASS INDEX: 42.26 KG/M2 | WEIGHT: 303 LBS | HEART RATE: 115 BPM | TEMPERATURE: 98 F | SYSTOLIC BLOOD PRESSURE: 162 MMHG | OXYGEN SATURATION: 97 %

## 2021-01-11 DIAGNOSIS — I10 HYPERTENSION, UNSPECIFIED TYPE: ICD-10-CM

## 2021-01-11 DIAGNOSIS — M25.522 PAIN IN JOINT, UPPER ARM, LEFT: Primary | ICD-10-CM

## 2021-01-11 LAB
ALBUMIN SERPL-MCNC: 4.1 G/DL (ref 3.5–5.7)
ALP SERPL-CCNC: 58 U/L (ref 34–104)
ALT SERPL W P-5'-P-CCNC: 34 U/L (ref 7–52)
ANION GAP SERPL CALCULATED.3IONS-SCNC: 6 MMOL/L (ref 3–14)
AST SERPL W P-5'-P-CCNC: 17 U/L (ref 13–39)
BASOPHILS # BLD AUTO: 0 10E9/L (ref 0–0.2)
BASOPHILS NFR BLD AUTO: 0.5 %
BILIRUB SERPL-MCNC: 0.4 MG/DL (ref 0.3–1)
BUN SERPL-MCNC: 16 MG/DL (ref 7–25)
CALCIUM SERPL-MCNC: 9.1 MG/DL (ref 8.6–10.3)
CHLORIDE SERPL-SCNC: 104 MMOL/L (ref 98–107)
CO2 SERPL-SCNC: 29 MMOL/L (ref 21–31)
CREAT SERPL-MCNC: 0.99 MG/DL (ref 0.7–1.3)
DIFFERENTIAL METHOD BLD: NORMAL
EOSINOPHIL # BLD AUTO: 0.2 10E9/L (ref 0–0.7)
EOSINOPHIL NFR BLD AUTO: 2.7 %
ERYTHROCYTE [DISTWIDTH] IN BLOOD BY AUTOMATED COUNT: 12.4 % (ref 10–15)
GFR SERPL CREATININE-BSD FRML MDRD: >90 ML/MIN/{1.73_M2}
GLUCOSE SERPL-MCNC: 103 MG/DL (ref 70–105)
HCT VFR BLD AUTO: 44.5 % (ref 40–53)
HGB BLD-MCNC: 15.4 G/DL (ref 13.3–17.7)
IMM GRANULOCYTES # BLD: 0 10E9/L (ref 0–0.4)
IMM GRANULOCYTES NFR BLD: 0.2 %
LYMPHOCYTES # BLD AUTO: 2.6 10E9/L (ref 0.8–5.3)
LYMPHOCYTES NFR BLD AUTO: 31.4 %
MCH RBC QN AUTO: 32.8 PG (ref 26.5–33)
MCHC RBC AUTO-ENTMCNC: 34.6 G/DL (ref 31.5–36.5)
MCV RBC AUTO: 95 FL (ref 78–100)
MONOCYTES # BLD AUTO: 0.7 10E9/L (ref 0–1.3)
MONOCYTES NFR BLD AUTO: 8.8 %
NEUTROPHILS # BLD AUTO: 4.6 10E9/L (ref 1.6–8.3)
NEUTROPHILS NFR BLD AUTO: 56.4 %
PLATELET # BLD AUTO: 265 10E9/L (ref 150–450)
POTASSIUM SERPL-SCNC: 3.6 MMOL/L (ref 3.5–5.1)
PROT SERPL-MCNC: 6.3 G/DL (ref 6.4–8.9)
RBC # BLD AUTO: 4.69 10E12/L (ref 4.4–5.9)
SODIUM SERPL-SCNC: 139 MMOL/L (ref 134–144)
WBC # BLD AUTO: 8.2 10E9/L (ref 4–11)

## 2021-01-11 PROCEDURE — 36415 COLL VENOUS BLD VENIPUNCTURE: CPT | Mod: ZL | Performed by: FAMILY MEDICINE

## 2021-01-11 PROCEDURE — 93000 ELECTROCARDIOGRAM COMPLETE: CPT | Performed by: INTERNAL MEDICINE

## 2021-01-11 PROCEDURE — 85025 COMPLETE CBC W/AUTO DIFF WBC: CPT | Mod: ZL | Performed by: FAMILY MEDICINE

## 2021-01-11 PROCEDURE — 80053 COMPREHEN METABOLIC PANEL: CPT | Mod: ZL | Performed by: FAMILY MEDICINE

## 2021-01-11 PROCEDURE — 99214 OFFICE O/P EST MOD 30 MIN: CPT | Performed by: FAMILY MEDICINE

## 2021-01-11 RX ORDER — LISINOPRIL 10 MG/1
10 TABLET ORAL DAILY
Qty: 90 TABLET | Refills: 3 | Status: SHIPPED | OUTPATIENT
Start: 2021-01-11 | End: 2021-06-14

## 2021-01-11 ASSESSMENT — ENCOUNTER SYMPTOMS
EYES NEGATIVE: 1
ARTHRALGIAS: 1
CHILLS: 0
RESPIRATORY NEGATIVE: 1
FEVER: 0
DIZZINESS: 1

## 2021-01-11 ASSESSMENT — PAIN SCALES - GENERAL: PAINLEVEL: NO PAIN (0)

## 2021-01-11 NOTE — NURSING NOTE
Patient here for continued tightness and pain behind the left shoulder. He feels a fog in his head, tunnel vision tingling in his fingers and his hands feel tight. Medication Reconciliation: complete.    Lakesha Donohue LPN  1/11/2021 4:11 PM

## 2021-01-11 NOTE — PROGRESS NOTES
SUBJECTIVE:   Earnest Richardson is a 25 year old male who presents to clinic today for the following health issues: Left scapular pain    Patient arrives here for l ongoing left scapular pain.  He reports no improvement.  Complains as a tightness.  Comes and goes.  Does not seem to be associated with any activity.  Reports tingling in his fingers.  Is been going on for a number of months.  He continues to be worried about cardiac.  He also reports an episode where he almost fainted.  Does not give a history of palpitations.  He denies any chest pain.  Patient does not have a history of hypertension that he is aware of.  Continues to smoke.        Patient Active Problem List    Diagnosis Date Noted     Morbid obesity (H) 12/14/2020     Priority: Medium     Chronic recurrent pilonidal cyst without abscess 06/26/2020     Priority: Medium     Added automatically from request for surgery [id number]       Other male erectile dysfunction 09/26/2018     Priority: Medium     NOAM (generalized anxiety disorder) 09/26/2018     Priority: Medium     Major depressive disorder, single episode, mild (H) 09/26/2018     Priority: Medium     Tobacco use disorder 09/26/2018     Priority: Medium     BMI (body mass index), pediatric, > 99% for age 01/17/2012     Priority: Medium     Headache 01/17/2012     Priority: Medium     Overview:   Updated per 10/1/17 IMO import       Oppositional defiant disorder 05/26/2010     Priority: Medium     Overview:   IMO Update 10/11       Dysthymic disorder 04/20/2010     Priority: Medium     Attention deficit hyperactivity disorder (ADHD) 04/17/2008     Priority: Medium     Overview:   IMO Update 10 2016       Past Medical History:   Diagnosis Date     NOAM (generalized anxiety disorder) 9/26/2018     Major depressive disorder, single episode, mild (H) 9/26/2018        Review of Systems   Constitutional: Negative for chills and fever.   Eyes: Negative.    Respiratory: Negative.    Cardiovascular: Negative  for chest pain.   Musculoskeletal: Positive for arthralgias.   Neurological: Positive for dizziness.        OBJECTIVE:     BP (!) 162/102   Pulse 115   Temp 98  F (36.7  C)   Resp 20   Wt 137.4 kg (303 lb)   SpO2 97%   BMI 42.26 kg/m    Body mass index is 42.26 kg/m .  Physical Exam  Constitutional:       Appearance: Normal appearance. He is obese.   Cardiovascular:      Rate and Rhythm: Normal rate and regular rhythm.      Heart sounds: No murmur.   Pulmonary:      Effort: Pulmonary effort is normal.      Breath sounds: Normal breath sounds.   Abdominal:      General: Abdomen is flat.   Musculoskeletal: Normal range of motion.         General: No swelling.      Comments: Pain is slightly reproduced with palpation just medial to the left scapular   Skin:     General: Skin is warm.   Neurological:      Mental Status: He is alert.         Diagnostic Test Results:  EKG was done which was unremarkable.    ASSESSMENT/PLAN:         1. Pain in joint, upper arm, left  Likely muscular in origin.  Reassurance is given.  - EKG 12-lead, tracing only    2. Hypertension, unspecified type  We will start lisinopril.  Advised follow-up in 1 month for blood pressure check.  Labs are drawn and will review when they return.  Discussed weight loss.  Better diet.  - lisinopril (ZESTRIL) 10 MG tablet; Take 1 tablet (10 mg) by mouth daily  Dispense: 90 tablet; Refill: 3  - Comprehensive Metabolic Panel; Future  - CBC and Differential; Future  - CBC and Differential  - Comprehensive Metabolic Panel      Taj Veras MD  [facility] AND [facility]

## 2021-01-12 LAB — INTERPRETATION ECG - MUSE: NORMAL

## 2021-01-15 ENCOUNTER — HEALTH MAINTENANCE LETTER (OUTPATIENT)
Age: 26
End: 2021-01-15

## 2021-02-08 ENCOUNTER — ALLIED HEALTH/NURSE VISIT (OUTPATIENT)
Dept: FAMILY MEDICINE | Facility: OTHER | Age: 26
End: 2021-02-08
Attending: FAMILY MEDICINE
Payer: COMMERCIAL

## 2021-02-08 DIAGNOSIS — R06.02 SOB (SHORTNESS OF BREATH): ICD-10-CM

## 2021-02-08 DIAGNOSIS — R19.7 DIARRHEA: ICD-10-CM

## 2021-02-08 DIAGNOSIS — R05.9 COUGH: Primary | ICD-10-CM

## 2021-02-08 DIAGNOSIS — R52 BODY ACHES: ICD-10-CM

## 2021-02-08 PROCEDURE — U0005 INFEC AGEN DETEC AMPLI PROBE: HCPCS | Mod: ZL | Performed by: FAMILY MEDICINE

## 2021-02-08 PROCEDURE — U0003 INFECTIOUS AGENT DETECTION BY NUCLEIC ACID (DNA OR RNA); SEVERE ACUTE RESPIRATORY SYNDROME CORONAVIRUS 2 (SARS-COV-2) (CORONAVIRUS DISEASE [COVID-19]), AMPLIFIED PROBE TECHNIQUE, MAKING USE OF HIGH THROUGHPUT TECHNOLOGIES AS DESCRIBED BY CMS-2020-01-R: HCPCS | Mod: ZL | Performed by: FAMILY MEDICINE

## 2021-02-08 PROCEDURE — C9803 HOPD COVID-19 SPEC COLLECT: HCPCS

## 2021-02-09 LAB
SARS-COV-2 RNA RESP QL NAA+PROBE: NOT DETECTED
SPECIMEN SOURCE: NORMAL

## 2021-04-19 ENCOUNTER — OFFICE VISIT (OUTPATIENT)
Dept: FAMILY MEDICINE | Facility: OTHER | Age: 26
End: 2021-04-19
Attending: FAMILY MEDICINE
Payer: COMMERCIAL

## 2021-04-19 VITALS
BODY MASS INDEX: 38.77 KG/M2 | HEART RATE: 90 BPM | RESPIRATION RATE: 20 BRPM | SYSTOLIC BLOOD PRESSURE: 170 MMHG | OXYGEN SATURATION: 99 % | TEMPERATURE: 99 F | WEIGHT: 278 LBS | DIASTOLIC BLOOD PRESSURE: 103 MMHG

## 2021-04-19 DIAGNOSIS — F32.0 MAJOR DEPRESSIVE DISORDER, SINGLE EPISODE, MILD (H): Primary | ICD-10-CM

## 2021-04-19 DIAGNOSIS — I10 ESSENTIAL HYPERTENSION: ICD-10-CM

## 2021-04-19 PROCEDURE — 99214 OFFICE O/P EST MOD 30 MIN: CPT | Performed by: FAMILY MEDICINE

## 2021-04-19 RX ORDER — LOSARTAN POTASSIUM 50 MG/1
50 TABLET ORAL DAILY
Qty: 90 TABLET | Refills: 3 | Status: SHIPPED | OUTPATIENT
Start: 2021-04-19 | End: 2021-06-14

## 2021-04-19 ASSESSMENT — ANXIETY QUESTIONNAIRES
1. FEELING NERVOUS, ANXIOUS, OR ON EDGE: MORE THAN HALF THE DAYS
5. BEING SO RESTLESS THAT IT IS HARD TO SIT STILL: SEVERAL DAYS
3. WORRYING TOO MUCH ABOUT DIFFERENT THINGS: MORE THAN HALF THE DAYS
IF YOU CHECKED OFF ANY PROBLEMS ON THIS QUESTIONNAIRE, HOW DIFFICULT HAVE THESE PROBLEMS MADE IT FOR YOU TO DO YOUR WORK, TAKE CARE OF THINGS AT HOME, OR GET ALONG WITH OTHER PEOPLE: SOMEWHAT DIFFICULT
7. FEELING AFRAID AS IF SOMETHING AWFUL MIGHT HAPPEN: NEARLY EVERY DAY
GAD7 TOTAL SCORE: 13
2. NOT BEING ABLE TO STOP OR CONTROL WORRYING: MORE THAN HALF THE DAYS
6. BECOMING EASILY ANNOYED OR IRRITABLE: MORE THAN HALF THE DAYS

## 2021-04-19 ASSESSMENT — PATIENT HEALTH QUESTIONNAIRE - PHQ9
SUM OF ALL RESPONSES TO PHQ QUESTIONS 1-9: 14
5. POOR APPETITE OR OVEREATING: SEVERAL DAYS

## 2021-04-19 ASSESSMENT — PAIN SCALES - GENERAL: PAINLEVEL: NO PAIN (0)

## 2021-04-19 NOTE — NURSING NOTE
"Chief Complaint   Patient presents with     Recheck Medication     BP med & cough       Initial BP (!) 144/96   Pulse 90   Temp 99  F (37.2  C) (Tympanic)   Resp 20   Wt 126.1 kg (278 lb)   SpO2 99%   BMI 38.77 kg/m   Estimated body mass index is 38.77 kg/m  as calculated from the following:    Height as of 6/23/20: 1.803 m (5' 11\").    Weight as of this encounter: 126.1 kg (278 lb).  Medication Reconciliation: complete    Niya Bonner CMA (AAMA)  "

## 2021-04-20 ASSESSMENT — ANXIETY QUESTIONNAIRES: GAD7 TOTAL SCORE: 13

## 2021-04-20 NOTE — PROGRESS NOTES
SUBJECTIVE:   Earnest Richardson is a 25 year old male who presents to clinic today for the following health issues: Medication refill    Patient arrives here for medication refill.  Patient is on Zoloft for depression.  He takes 1 tablet a day.  Is also on high blood pressure meds.  His chart reviewed showing he did have a comp panel back in January which was satisfactory.  Patient was on lisinopril but reports he did have a chronic cough.  He has stopped his lisinopril.  States the Zoloft is working well.        Patient Active Problem List    Diagnosis Date Noted     Essential hypertension 04/19/2021     Priority: Medium     Morbid obesity (H) 12/14/2020     Priority: Medium     Chronic recurrent pilonidal cyst without abscess 06/26/2020     Priority: Medium     Added automatically from request for surgery [id number]       Other male erectile dysfunction 09/26/2018     Priority: Medium     NOAM (generalized anxiety disorder) 09/26/2018     Priority: Medium     Major depressive disorder, single episode, mild (H) 09/26/2018     Priority: Medium     Tobacco use disorder 09/26/2018     Priority: Medium     BMI (body mass index), pediatric, > 99% for age 01/17/2012     Priority: Medium     Oppositional defiant disorder 05/26/2010     Priority: Medium     Overview:   IMO Update 10/11       Dysthymic disorder 04/20/2010     Priority: Medium     Attention deficit hyperactivity disorder (ADHD) 04/17/2008     Priority: Medium     Overview:   IMO Update 10 2016       Past Medical History:   Diagnosis Date     NOAM (generalized anxiety disorder) 9/26/2018     Major depressive disorder, single episode, mild (H) 9/26/2018      Past Surgical History:   Procedure Laterality Date     CYSTECTOMY PILONIDAL N/A 7/8/2020    Procedure: EXCISION, PILONIDAL CYST;  Surgeon: Jackson Bernabe MD;  Location:  OR     EXCISE PILONIDAL CYST, SIMPLE Right 07/08/2020     NO HISTORY OF SURGERY         Review of Systems     OBJECTIVE:     BP (!)  170/103 (BP Location: Other (Comment))   Pulse 90   Temp 99  F (37.2  C) (Tympanic)   Resp 20   Wt 126.1 kg (278 lb)   SpO2 99%   BMI 38.77 kg/m    Body mass index is 38.77 kg/m .  Physical Exam  Constitutional:       Appearance: Normal appearance.   HENT:      Head: Normocephalic and atraumatic.   Cardiovascular:      Rate and Rhythm: Normal rate and regular rhythm.   Pulmonary:      Effort: Pulmonary effort is normal.      Breath sounds: Normal breath sounds.   Neurological:      Mental Status: He is alert.   Psychiatric:         Mood and Affect: Mood normal.         Diagnostic Test Results:  No results found for any visits on 04/19/21.    ASSESSMENT/PLAN:         1. Major depressive disorder, single episode, mild (H)  Doing well refill x1 year  - sertraline (ZOLOFT) 50 MG tablet; Take 1 tablet (50 mg) by mouth daily  Dispense: 90 tablet; Refill: 1    2. Essential hypertension  Chronic cough with lisinopril.  We will discontinue it.  Start Cozaar.  Encouraged blood pressure checks on an outpatient basis.  Currently not under good control.  Follow-up in 1 month for recheck  - losartan (COZAAR) 50 MG tablet; Take 1 tablet (50 mg) by mouth daily  Dispense: 90 tablet; Refill: 3      Taj Veras MD  [facility]

## 2021-05-21 ENCOUNTER — IMMUNIZATION (OUTPATIENT)
Dept: FAMILY MEDICINE | Facility: OTHER | Age: 26
End: 2021-05-21
Attending: FAMILY MEDICINE
Payer: COMMERCIAL

## 2021-05-21 PROCEDURE — 91300 PR COVID VAC PFIZER DIL RECON 30 MCG/0.3 ML IM: CPT

## 2021-05-21 PROCEDURE — 0001A PR COVID VAC PFIZER DIL RECON 30 MCG/0.3 ML IM: CPT

## 2021-06-11 ENCOUNTER — IMMUNIZATION (OUTPATIENT)
Dept: FAMILY MEDICINE | Facility: OTHER | Age: 26
End: 2021-06-11
Attending: FAMILY MEDICINE
Payer: COMMERCIAL

## 2021-06-11 PROCEDURE — 91300 PR COVID VAC PFIZER DIL RECON 30 MCG/0.3 ML IM: CPT

## 2021-06-11 PROCEDURE — 0002A PR COVID VAC PFIZER DIL RECON 30 MCG/0.3 ML IM: CPT

## 2021-06-14 ENCOUNTER — OFFICE VISIT (OUTPATIENT)
Dept: FAMILY MEDICINE | Facility: OTHER | Age: 26
End: 2021-06-14
Attending: FAMILY MEDICINE
Payer: COMMERCIAL

## 2021-06-14 VITALS
DIASTOLIC BLOOD PRESSURE: 88 MMHG | OXYGEN SATURATION: 98 % | RESPIRATION RATE: 16 BRPM | SYSTOLIC BLOOD PRESSURE: 132 MMHG | HEIGHT: 71 IN | HEART RATE: 72 BPM | BODY MASS INDEX: 37.55 KG/M2 | WEIGHT: 268.2 LBS | TEMPERATURE: 98.1 F

## 2021-06-14 DIAGNOSIS — I10 ESSENTIAL HYPERTENSION: ICD-10-CM

## 2021-06-14 PROCEDURE — 99213 OFFICE O/P EST LOW 20 MIN: CPT | Performed by: FAMILY MEDICINE

## 2021-06-14 RX ORDER — LOSARTAN POTASSIUM 100 MG/1
100 TABLET ORAL DAILY
Qty: 90 TABLET | Refills: 3 | Status: SHIPPED | OUTPATIENT
Start: 2021-06-14

## 2021-06-14 SDOH — HEALTH STABILITY: MENTAL HEALTH: HOW OFTEN DO YOU HAVE A DRINK CONTAINING ALCOHOL?: NOT ASKED

## 2021-06-14 SDOH — HEALTH STABILITY: MENTAL HEALTH: HOW OFTEN DO YOU HAVE 6 OR MORE DRINKS ON ONE OCCASION?: NOT ASKED

## 2021-06-14 SDOH — HEALTH STABILITY: MENTAL HEALTH: HOW MANY STANDARD DRINKS CONTAINING ALCOHOL DO YOU HAVE ON A TYPICAL DAY?: NOT ASKED

## 2021-06-14 ASSESSMENT — MIFFLIN-ST. JEOR: SCORE: 2218.68

## 2021-06-14 ASSESSMENT — PAIN SCALES - GENERAL: PAINLEVEL: NO PAIN (0)

## 2021-06-15 NOTE — PROGRESS NOTES
"  SUBJECTIVE:   Eanrest Richardson is a 25 year old male who presents to clinic today for the following health issues: Medication recheck    Patient arrives here for medication follow-up.  Patient is in need of a refill of his Cozaar.  He has been taking 75 mg a day.  Typically has been comfortable half a tablet in half.  He states when he does that his blood pressure is under better control than if just at 50 mg a day        Patient Active Problem List    Diagnosis Date Noted     Essential hypertension 04/19/2021     Priority: Medium     Morbid obesity (H) 12/14/2020     Priority: Medium     Chronic recurrent pilonidal cyst without abscess 06/26/2020     Priority: Medium     Added automatically from request for surgery [id number]       Other male erectile dysfunction 09/26/2018     Priority: Medium     NOAM (generalized anxiety disorder) 09/26/2018     Priority: Medium     Major depressive disorder, single episode, mild (H) 09/26/2018     Priority: Medium     Tobacco use disorder 09/26/2018     Priority: Medium     BMI (body mass index), pediatric, > 99% for age 01/17/2012     Priority: Medium     Oppositional defiant disorder 05/26/2010     Priority: Medium     Overview:   IMO Update 10/11       Dysthymic disorder 04/20/2010     Priority: Medium     Attention deficit hyperactivity disorder (ADHD) 04/17/2008     Priority: Medium     Overview:   IMO Update 10 2016         Review of Systems     OBJECTIVE:     /88   Pulse 72   Temp 98.1  F (36.7  C)   Resp 16   Ht 1.803 m (5' 11\")   Wt 121.7 kg (268 lb 3.2 oz)   SpO2 98%   BMI 37.41 kg/m    Body mass index is 37.41 kg/m .  Physical Exam  Constitutional:       Appearance: Normal appearance.   Cardiovascular:      Rate and Rhythm: Normal rate and regular rhythm.   Pulmonary:      Effort: Pulmonary effort is normal.   Neurological:      Mental Status: He is alert.         Diagnostic Test Results:  none     ASSESSMENT/PLAN:         1. Essential hypertension  Refill.  " Last chemistry panel satisfactory in January.  Follow-up in approximately 1 year.  - losartan (COZAAR) 100 MG tablet; Take 1 tablet (100 mg) by mouth daily  Dispense: 90 tablet; Refill: 3      Taj Veras MD  [facility]

## 2021-07-05 ENCOUNTER — OFFICE VISIT (OUTPATIENT)
Dept: FAMILY MEDICINE | Facility: OTHER | Age: 26
End: 2021-07-05
Attending: FAMILY MEDICINE
Payer: COMMERCIAL

## 2021-07-05 VITALS
OXYGEN SATURATION: 97 % | WEIGHT: 260 LBS | HEART RATE: 77 BPM | RESPIRATION RATE: 16 BRPM | BODY MASS INDEX: 37.22 KG/M2 | SYSTOLIC BLOOD PRESSURE: 102 MMHG | HEIGHT: 70 IN | TEMPERATURE: 97.2 F | DIASTOLIC BLOOD PRESSURE: 74 MMHG

## 2021-07-05 DIAGNOSIS — F34.1 DYSTHYMIC DISORDER: ICD-10-CM

## 2021-07-05 DIAGNOSIS — I10 ESSENTIAL HYPERTENSION: Primary | ICD-10-CM

## 2021-07-05 DIAGNOSIS — R41.89 BRAIN FOG: ICD-10-CM

## 2021-07-05 PROCEDURE — 99214 OFFICE O/P EST MOD 30 MIN: CPT | Performed by: FAMILY MEDICINE

## 2021-07-05 ASSESSMENT — ANXIETY QUESTIONNAIRES
3. WORRYING TOO MUCH ABOUT DIFFERENT THINGS: SEVERAL DAYS
6. BECOMING EASILY ANNOYED OR IRRITABLE: MORE THAN HALF THE DAYS
7. FEELING AFRAID AS IF SOMETHING AWFUL MIGHT HAPPEN: NEARLY EVERY DAY
5. BEING SO RESTLESS THAT IT IS HARD TO SIT STILL: SEVERAL DAYS
2. NOT BEING ABLE TO STOP OR CONTROL WORRYING: SEVERAL DAYS
GAD7 TOTAL SCORE: 10
1. FEELING NERVOUS, ANXIOUS, OR ON EDGE: SEVERAL DAYS
IF YOU CHECKED OFF ANY PROBLEMS ON THIS QUESTIONNAIRE, HOW DIFFICULT HAVE THESE PROBLEMS MADE IT FOR YOU TO DO YOUR WORK, TAKE CARE OF THINGS AT HOME, OR GET ALONG WITH OTHER PEOPLE: SOMEWHAT DIFFICULT

## 2021-07-05 ASSESSMENT — PATIENT HEALTH QUESTIONNAIRE - PHQ9
SUM OF ALL RESPONSES TO PHQ QUESTIONS 1-9: 8
5. POOR APPETITE OR OVEREATING: SEVERAL DAYS

## 2021-07-05 ASSESSMENT — PAIN SCALES - GENERAL: PAINLEVEL: NO PAIN (0)

## 2021-07-05 ASSESSMENT — MIFFLIN-ST. JEOR: SCORE: 2160.6

## 2021-07-05 NOTE — NURSING NOTE
"Chief Complaint   Patient presents with     Dizziness     front of head     Patient presented with brain fog that started few weeks ago.  Patient stated feels like ground is moving underneath him and feels like there is something in lung to cough up but nothing comes up. Can concentrate but feels like a heavy fog when he does and also been feeling dizzy.  Initial Pulse 77   Temp 97.2  F (36.2  C) (Temporal)   Resp 16   Ht 1.778 m (5' 10\")   Wt 117.9 kg (260 lb)   SpO2 97%   BMI 37.31 kg/m   Estimated body mass index is 37.31 kg/m  as calculated from the following:    Height as of this encounter: 1.778 m (5' 10\").    Weight as of this encounter: 117.9 kg (260 lb).  Medication Reconciliation: complete    Denise Cordero LPN  "

## 2021-07-06 PROBLEM — R41.89 BRAIN FOG: Status: ACTIVE | Noted: 2021-07-06

## 2021-07-06 ASSESSMENT — ANXIETY QUESTIONNAIRES: GAD7 TOTAL SCORE: 10

## 2021-07-06 NOTE — PROGRESS NOTES
"  SUBJECTIVE:   Earnest Richardson is a 26 year old male who presents to clinic today for the following health issues: Dizziness brain fog    Patient arrives here for dizziness and brain fog.  Started couple weeks ago when his blood pressure medication was increased.  Feels like the ground is moving under him.  Feels like his legs are heavy.  He cannot concentrate but has a \"heavy fog\".  He has noticed decreased energy over the last 2 years.  Finds he is tired.  His balance is off especially over the last 2 weeks.  He also reports his left arm is \"kind of\" weak.  At times.  Denies any pain.  No fevers or chills.  No other complaints.        Patient Active Problem List    Diagnosis Date Noted     Brain fog 07/06/2021     Priority: Medium     Essential hypertension 04/19/2021     Priority: Medium     Morbid obesity (H) 12/14/2020     Priority: Medium     Chronic recurrent pilonidal cyst without abscess 06/26/2020     Priority: Medium     Added automatically from request for surgery [id number]       Other male erectile dysfunction 09/26/2018     Priority: Medium     NOAM (generalized anxiety disorder) 09/26/2018     Priority: Medium     Major depressive disorder, single episode, mild (H) 09/26/2018     Priority: Medium     Tobacco use disorder 09/26/2018     Priority: Medium     BMI (body mass index), pediatric, > 99% for age 01/17/2012     Priority: Medium     Oppositional defiant disorder 05/26/2010     Priority: Medium     Overview:   IMO Update 10/11       Dysthymic disorder 04/20/2010     Priority: Medium     Attention deficit hyperactivity disorder (ADHD) 04/17/2008     Priority: Medium     Overview:   IMO Update 10 2016       Past Medical History:   Diagnosis Date     NOAM (generalized anxiety disorder) 9/26/2018     Major depressive disorder, single episode, mild (H) 9/26/2018        Review of Systems     OBJECTIVE:     /74 (BP Location: Right arm, Patient Position: Sitting, Cuff Size: Adult Large)   Pulse 77   " "Temp 97.2  F (36.2  C) (Temporal)   Resp 16   Ht 1.778 m (5' 10\")   Wt 117.9 kg (260 lb)   SpO2 97%   BMI 37.31 kg/m    Body mass index is 37.31 kg/m .  Physical Exam  HENT:      Head: Normocephalic and atraumatic.   Musculoskeletal: Normal range of motion.   Skin:     General: Skin is warm.   Neurological:      General: No focal deficit present.      Motor: No weakness.      Coordination: Coordination normal.      Gait: Gait normal.      Deep Tendon Reflexes: Reflexes normal.   Psychiatric:         Mood and Affect: Mood normal.         Thought Content: Thought content normal.         Diagnostic Test Results:  none     ASSESSMENT/PLAN:         1. Essential hypertension  Recent labs are satisfactory.    2. Dysthymic disorder      3. Brain fog  Etiology unclear.  His blood pressure is excellent and maybe is having little hypotension.  We will decrease his losartan to 50 mg a day.  Follow-up in 1 week for recheck.      Taj Veras MD  [facility] AND hospitals  "

## 2021-07-30 ENCOUNTER — APPOINTMENT (OUTPATIENT)
Dept: GENERAL RADIOLOGY | Facility: OTHER | Age: 26
End: 2021-07-30
Attending: PHYSICIAN ASSISTANT
Payer: COMMERCIAL

## 2021-07-30 ENCOUNTER — NURSE TRIAGE (OUTPATIENT)
Dept: FAMILY MEDICINE | Facility: OTHER | Age: 26
End: 2021-07-30

## 2021-07-30 ENCOUNTER — TELEPHONE (OUTPATIENT)
Dept: FAMILY MEDICINE | Facility: OTHER | Age: 26
End: 2021-07-30

## 2021-07-30 ENCOUNTER — HOSPITAL ENCOUNTER (EMERGENCY)
Facility: OTHER | Age: 26
Discharge: HOME | End: 2021-07-30
Attending: PHYSICIAN ASSISTANT | Admitting: PHYSICIAN ASSISTANT
Payer: COMMERCIAL

## 2021-07-30 VITALS
DIASTOLIC BLOOD PRESSURE: 60 MMHG | SYSTOLIC BLOOD PRESSURE: 121 MMHG | WEIGHT: 265 LBS | RESPIRATION RATE: 16 BRPM | BODY MASS INDEX: 37.94 KG/M2 | HEART RATE: 66 BPM | OXYGEN SATURATION: 95 % | HEIGHT: 70 IN | TEMPERATURE: 99.2 F

## 2021-07-30 DIAGNOSIS — R07.9 CHEST PAIN: ICD-10-CM

## 2021-07-30 LAB
ALBUMIN SERPL-MCNC: 3.7 G/DL (ref 3.5–5.7)
ALBUMIN UR-MCNC: NEGATIVE MG/DL
ALP SERPL-CCNC: 46 U/L (ref 34–104)
ALT SERPL W P-5'-P-CCNC: 18 U/L (ref 7–52)
ANION GAP SERPL CALCULATED.3IONS-SCNC: 8 MMOL/L (ref 3–14)
APPEARANCE UR: CLEAR
AST SERPL W P-5'-P-CCNC: 12 U/L (ref 13–39)
ATRIAL RATE - MUSE: 66 BPM
BASOPHILS # BLD AUTO: 0 10E3/UL (ref 0–0.2)
BASOPHILS NFR BLD AUTO: 1 %
BILIRUB SERPL-MCNC: 0.5 MG/DL (ref 0.3–1)
BILIRUB UR QL STRIP: NEGATIVE
BUN SERPL-MCNC: 12 MG/DL (ref 7–25)
CALCIUM SERPL-MCNC: 9 MG/DL (ref 8.6–10.3)
CHLORIDE BLD-SCNC: 107 MMOL/L (ref 98–107)
CO2 SERPL-SCNC: 23 MMOL/L (ref 21–31)
COLOR UR AUTO: YELLOW
CREAT SERPL-MCNC: 0.78 MG/DL (ref 0.7–1.3)
DIASTOLIC BLOOD PRESSURE - MUSE: NORMAL MMHG
EOSINOPHIL # BLD AUTO: 0.1 10E3/UL (ref 0–0.7)
EOSINOPHIL NFR BLD AUTO: 1 %
ERYTHROCYTE [DISTWIDTH] IN BLOOD BY AUTOMATED COUNT: 11.9 % (ref 10–15)
GFR SERPL CREATININE-BSD FRML MDRD: >90 ML/MIN/1.73M2
GLUCOSE BLD-MCNC: 95 MG/DL (ref 70–105)
GLUCOSE UR STRIP-MCNC: NEGATIVE MG/DL
HCT VFR BLD AUTO: 42.5 % (ref 40–53)
HGB BLD-MCNC: 14.7 G/DL (ref 13.3–17.7)
HGB UR QL STRIP: NEGATIVE
IMM GRANULOCYTES # BLD: 0 10E3/UL
IMM GRANULOCYTES NFR BLD: 0 %
INTERPRETATION ECG - MUSE: NORMAL
KETONES UR STRIP-MCNC: NEGATIVE MG/DL
LEUKOCYTE ESTERASE UR QL STRIP: NEGATIVE
LYMPHOCYTES # BLD AUTO: 1.6 10E3/UL (ref 0.8–5.3)
LYMPHOCYTES NFR BLD AUTO: 19 %
MCH RBC QN AUTO: 33.5 PG (ref 26.5–33)
MCHC RBC AUTO-ENTMCNC: 34.6 G/DL (ref 31.5–36.5)
MCV RBC AUTO: 97 FL (ref 78–100)
MONOCYTES # BLD AUTO: 0.7 10E3/UL (ref 0–1.3)
MONOCYTES NFR BLD AUTO: 8 %
NEUTROPHILS # BLD AUTO: 6.1 10E3/UL (ref 1.6–8.3)
NEUTROPHILS NFR BLD AUTO: 71 %
NITRATE UR QL: NEGATIVE
NRBC # BLD AUTO: 0 10E3/UL
NRBC BLD AUTO-RTO: 0 /100
NT-PROBNP SERPL-MCNC: 31 PG/ML (ref 0–100)
P AXIS - MUSE: 1 DEGREES
PH UR STRIP: 6 [PH] (ref 5–9)
PLATELET # BLD AUTO: 210 10E3/UL (ref 150–450)
POTASSIUM BLD-SCNC: 3.8 MMOL/L (ref 3.5–5.1)
PR INTERVAL - MUSE: 190 MS
PROT SERPL-MCNC: 5.8 G/DL (ref 6.4–8.9)
QRS DURATION - MUSE: 96 MS
QT - MUSE: 388 MS
QTC - MUSE: 406 MS
R AXIS - MUSE: 58 DEGREES
RBC # BLD AUTO: 4.39 10E6/UL (ref 4.4–5.9)
SODIUM SERPL-SCNC: 138 MMOL/L (ref 134–144)
SP GR UR STRIP: 1 (ref 1–1.03)
SYSTOLIC BLOOD PRESSURE - MUSE: NORMAL MMHG
T AXIS - MUSE: 23 DEGREES
TROPONIN I SERPL-MCNC: <2.4 PG/ML (ref 0–34)
UROBILINOGEN UR STRIP-MCNC: NORMAL MG/DL
VENTRICULAR RATE- MUSE: 66 BPM
WBC # BLD AUTO: 8.6 10E3/UL (ref 4–11)

## 2021-07-30 PROCEDURE — 84484 ASSAY OF TROPONIN QUANT: CPT | Performed by: PHYSICIAN ASSISTANT

## 2021-07-30 PROCEDURE — 93010 ELECTROCARDIOGRAM REPORT: CPT | Performed by: INTERNAL MEDICINE

## 2021-07-30 PROCEDURE — 99285 EMERGENCY DEPT VISIT HI MDM: CPT | Mod: 25 | Performed by: PHYSICIAN ASSISTANT

## 2021-07-30 PROCEDURE — 81003 URINALYSIS AUTO W/O SCOPE: CPT | Performed by: PHYSICIAN ASSISTANT

## 2021-07-30 PROCEDURE — 36415 COLL VENOUS BLD VENIPUNCTURE: CPT | Performed by: PHYSICIAN ASSISTANT

## 2021-07-30 PROCEDURE — 83880 ASSAY OF NATRIURETIC PEPTIDE: CPT | Performed by: PHYSICIAN ASSISTANT

## 2021-07-30 PROCEDURE — 85025 COMPLETE CBC W/AUTO DIFF WBC: CPT | Performed by: PHYSICIAN ASSISTANT

## 2021-07-30 PROCEDURE — 93005 ELECTROCARDIOGRAM TRACING: CPT | Performed by: PHYSICIAN ASSISTANT

## 2021-07-30 PROCEDURE — 82040 ASSAY OF SERUM ALBUMIN: CPT | Performed by: PHYSICIAN ASSISTANT

## 2021-07-30 PROCEDURE — 99284 EMERGENCY DEPT VISIT MOD MDM: CPT | Performed by: PHYSICIAN ASSISTANT

## 2021-07-30 PROCEDURE — 71045 X-RAY EXAM CHEST 1 VIEW: CPT

## 2021-07-30 ASSESSMENT — ENCOUNTER SYMPTOMS
BACK PAIN: 0
HEMATURIA: 0
CONFUSION: 0
WOUND: 0
ABDOMINAL PAIN: 0
SHORTNESS OF BREATH: 0
ADENOPATHY: 0
CHEST TIGHTNESS: 0
FEVER: 0
CHILLS: 0
BRUISES/BLEEDS EASILY: 0

## 2021-07-30 ASSESSMENT — MIFFLIN-ST. JEOR: SCORE: 2183.28

## 2021-07-30 NOTE — DISCHARGE INSTRUCTIONS
Get plenty of fluids and rest.  As we discussed, all of your lab work, vital signs, EKG, imaging appear well today.  You appear to be at low risk for major adverse cardiac event near future.  However, referral was placed for you to obtain an outpatient echocardiogram as well as follow-up with PCP for reassessment to discuss whether any further medication changes may be needed.  Please return to ED if there are worsening or concerning symptoms.

## 2021-07-30 NOTE — ED TRIAGE NOTES
EMS Arrival Note  ________________________________  Earnest LOPEZ Richardson is a 26 year old Male that arrives via [facility] ambulance service [facility]  Pre hospital clinical presentation per EMS personnel includes pt had a clinic appt at 1300 today, as he was driving to the appt he felt lightheaded like he was going to pass out, and SOB and felt his SpO2 might be low so he went back home (he was only a couple of blocks from home, episode lasted about 3 seconds) and called the ambulance.  Pt reports chest pressure, denies having chest pain, the left side of his tongue felt swollen and his toes felt numb. .  Pre hospital personnel report vital signs of:  B/P 147/77; HR 72, RR 18;SpO2 98%  Pre Hospital Cardiac rhythm reported as Normal Sinus  Pre hospital care included: EKG and IV placement  Patient arrives with:  GCS Total = 15  Airway intact  Breathing Assessment Normal  Circulation Assessment Normal  Patient arrives with a 18 gauge IV at his right anticubital     Placed in room 907, gowned, warm blanket provided, side rails up,  ID verified and band placed, and call light within reach.       Previous living situation Spouse and Children

## 2021-07-30 NOTE — TELEPHONE ENCOUNTER
"S-(situation): Patient forwarded for triage from Nori Soria [facility] nurse concerning 1340 appointment today, \"Bp check and left sided chest pain.\"    B-(background): Patient has history of hypertension    A-(assessment): Patient states pain \"pressure\" on left shoulder blade in arm pit into pectoral area of left side. Patient cannot rate on pain scale, states more of a pressure sensation, does not hurt. Patient states also has \"weird pain in left side of neck\". Denies bulging of neck or recent injury concerning left side/muscles. Patient states has had stressful few days recently, BP reading yesterday of 163/97. Declines to recheck BP at this time due to \"stressful day.\" Patient states pressure sensation comes and goes, \"shows up more when I'm lying down.\" Patient admits to having dizziness, not currently experiencing. Does have nausea occasionally. Patient describes having weird sensations in toes and hands, unable to elaborate more when asked to do so. Patient feels difficulty breathing in more shallow at times, not associated with chest pressure episodes. Denies fever, vision changes or recent/current illness. Patient states was changed to losartan in June 2021 for BP per Dr. Veras. Has been following with Dr. Veras with ongoing concerns. EKG was completed and per patient states had normal findings. Concerns and symptoms have been ongoing issue since this past winter and have not worsened, just continued. HR is normal according to patient.    R-(recommendations): Discussed with patient chest pain/pressure concern especially given other symptoms. Patient declines emergent care stating that this has been ongoing concerns and is nothing new. Advised patient to seek ED care if symptoms worsen. Discussed with patient notation will be routed for provider review prior to appointment and will call back if appointment deemed not appropriate for clinic. Patient verbalized understanding, no further questions at this time. " "    Reason for Disposition    Dizziness or lightheadedness     Not currently    Pain also in shoulder(s) or arm(s) or jaw     Patient states not painful, \"pressure\"    Patient wants to be seen    Additional Information    Negative: Chest pain or 'angina' comes and goes and is happening more often (increasing in frequency) or getting worse (increasing in severity) (Exception: chest pains that last only a few seconds)    Negative: Heart beating irregularly or very rapidly    Negative: Difficulty breathing    Negative: SEVERE chest pain    Negative: Followed an injury to chest    Negative: Visible sweat on face or sweat dripping down face    Negative: Major surgery in the past month    Protocols used: CHEST PAIN-A-OH    Kisha Henry RN ....................  7/30/2021   12:06 PM        "

## 2021-07-30 NOTE — ED PROVIDER NOTES
"  History     Chief Complaint   Patient presents with     Dizziness     Chest Pain     pressure, no pain     HPI  Earnest Richardson is a 26 year old male who presents to the ED via EMS for evaluation of chest pain.  He reports that he was supposed to have a doctor's appointments in the clinic today and while he was driving he felt some chest discomfort and a little lightheaded.  He turned around and went home.  His symptoms only lasted a few seconds.  He did call the ambulance.  He reports feeling a little swelling at the bottom of his feet as well as having both hands feel little \" off\" and he said the left side of his tongue also felt a little swollen.  He reports a history of hypertension and is on losartan, he also smokes and says that he has significant family history of cardiac issues.  He denies any recent fevers or coughs, abdominal pain, nausea or vomiting or dysuria.    Allergies:  Allergies   Allergen Reactions     Lisinopril Cough     Penicillins Unknown       Problem List:    Patient Active Problem List    Diagnosis Date Noted     Brain fog 07/06/2021     Priority: Medium     Essential hypertension 04/19/2021     Priority: Medium     Morbid obesity (H) 12/14/2020     Priority: Medium     Chronic recurrent pilonidal cyst without abscess 06/26/2020     Priority: Medium     Added automatically from request for surgery [id number]       Other male erectile dysfunction 09/26/2018     Priority: Medium     NOAM (generalized anxiety disorder) 09/26/2018     Priority: Medium     Major depressive disorder, single episode, mild (H) 09/26/2018     Priority: Medium     Tobacco use disorder 09/26/2018     Priority: Medium     BMI (body mass index), pediatric, > 99% for age 01/17/2012     Priority: Medium     Oppositional defiant disorder 05/26/2010     Priority: Medium     Overview:   IMO Update 10/11       Dysthymic disorder 04/20/2010     Priority: Medium     Attention deficit hyperactivity disorder (ADHD) 04/17/2008     " "Priority: Medium     Overview:   IMO Update 10 2016          Past Medical History:    Past Medical History:   Diagnosis Date     NOAM (generalized anxiety disorder) 9/26/2018     Major depressive disorder, single episode, mild (H) 9/26/2018       Past Surgical History:    Past Surgical History:   Procedure Laterality Date     CYSTECTOMY PILONIDAL N/A 7/8/2020    Procedure: EXCISION, PILONIDAL CYST;  Surgeon: Jackson Bernabe MD;  Location: [facility] OR     EXCISE PILONIDAL CYST, SIMPLE Right 07/08/2020     NO HISTORY OF SURGERY         Family History:    History reviewed. No pertinent family history.    Social History:  Marital Status:  Single [1]  Social History     Tobacco Use     Smoking status: Current Every Day Smoker     Packs/day: 0.25     Types: Cigarettes     Start date: 2014     Smokeless tobacco: Never Used     Tobacco comment: Weaning down so he can quit   Substance Use Topics     Alcohol use: Yes     Comment: rare     Drug use: Not Currently     Types: Marijuana     Comment: 4 uses per month        Medications:    losartan (COZAAR) 100 MG tablet          Review of Systems   Constitutional: Negative for chills and fever.   HENT: Negative for congestion.    Eyes: Negative for visual disturbance.   Respiratory: Negative for chest tightness and shortness of breath.    Cardiovascular: Positive for chest pain.   Gastrointestinal: Negative for abdominal pain.   Genitourinary: Negative for hematuria.   Musculoskeletal: Negative for back pain.   Skin: Negative for rash and wound.   Neurological: Negative for syncope.   Hematological: Negative for adenopathy. Does not bruise/bleed easily.   Psychiatric/Behavioral: Negative for confusion.       Physical Exam   BP: (!) 156/87  Pulse: 71  Temp: 99.2  F (37.3  C)  Resp: 18  Height: 177.8 cm (5' 10\")  Weight: 120.2 kg (265 lb)  SpO2: 98 %      Physical Exam  Constitutional:       General: He is not in acute distress.     Appearance: He is well-developed. He is not " diaphoretic.   HENT:      Head: Normocephalic and atraumatic.   Eyes:      General: No scleral icterus.     Conjunctiva/sclera: Conjunctivae normal.   Cardiovascular:      Rate and Rhythm: Normal rate and regular rhythm.   Pulmonary:      Effort: Pulmonary effort is normal.      Breath sounds: Normal breath sounds.   Abdominal:      Palpations: Abdomen is soft.      Tenderness: There is no abdominal tenderness.   Musculoskeletal:         General: No deformity.      Cervical back: Neck supple.   Lymphadenopathy:      Cervical: No cervical adenopathy.   Skin:     General: Skin is warm and dry.      Findings: No rash.   Neurological:      Mental Status: He is alert and oriented to person, place, and time. Mental status is at baseline.   Psychiatric:         Mood and Affect: Mood normal.         Behavior: Behavior normal.         ED Course        Procedures         EKG read at 1416. HR 66, NSR, no ST changes.     Critical Care time:  none               Results for orders placed or performed during the hospital encounter of 07/30/21 (from the past 24 hour(s))   EKG 12 lead   Result Value Ref Range    Systolic Blood Pressure  mmHg    Diastolic Blood Pressure  mmHg    Ventricular Rate 66 BPM    Atrial Rate 66 BPM    KY Interval 190 ms    QRS Duration 96 ms     ms    QTc 406 ms    P Axis 1 degrees    R AXIS 58 degrees    T Axis 23 degrees    Interpretation ECG       Sinus rhythm with sinus arrhythmia  Normal ECG  When compared with ECG of 11-JAN-2021 16:47,  No significant change was found  Confirmed by MD JAIME, ANNALISE ([id number]) on 7/30/2021 3:40:07 PM     UA reflex to Microscopic   Result Value Ref Range    Color Urine Yellow Colorless, Straw, Light Yellow, Yellow    Appearance Urine Clear Clear    Glucose Urine Negative Negative mg/dL    Bilirubin Urine Negative Negative    Ketones Urine Negative Negative mg/dL    Specific Gravity Urine 1.003 1.000 - 1.030    Blood Urine Negative Negative    pH Urine 6.0 5.0 - 9.0     Protein Albumin Urine Negative Negative mg/dL    Urobilinogen Urine Normal Normal, 2.0 mg/dL    Nitrite Urine Negative Negative    Leukocyte Esterase Urine Negative Negative    Narrative    Microscopic not indicated   CBC with platelets differential    Narrative    The following orders were created for panel order CBC with platelets differential.  Procedure                               Abnormality         Status                     ---------                               -----------         ------                     CBC with platelets and d...[[id number]]  Abnormal            Final result                 Please view results for these tests on the individual orders.   Comprehensive metabolic panel   Result Value Ref Range    Sodium 138 134 - 144 mmol/L    Potassium 3.8 3.5 - 5.1 mmol/L    Chloride 107 98 - 107 mmol/L    Carbon Dioxide (CO2) 23 21 - 31 mmol/L    Anion Gap 8 3 - 14 mmol/L    Urea Nitrogen 12 7 - 25 mg/dL    Creatinine 0.78 0.70 - 1.30 mg/dL    Calcium 9.0 8.6 - 10.3 mg/dL    Glucose 95 70 - 105 mg/dL    Alkaline Phosphatase 46 34 - 104 U/L    AST 12 (L) 13 - 39 U/L    ALT 18 7 - 52 U/L    Protein Total 5.8 (L) 6.4 - 8.9 g/dL    Albumin 3.7 3.5 - 5.7 g/dL    Bilirubin Total 0.5 0.3 - 1.0 mg/dL    GFR Estimate >90 >60 mL/min/1.73m2   Troponin I   Result Value Ref Range    Troponin I <2.4 0.0 - 34.0 pg/mL   Nt probnp inpatient (BNP)   Result Value Ref Range    N terminal Pro BNP Inpatient 31 0 - 100 pg/mL   CBC with platelets and differential   Result Value Ref Range    WBC Count 8.6 4.0 - 11.0 10e3/uL    RBC Count 4.39 (L) 4.40 - 5.90 10e6/uL    Hemoglobin 14.7 13.3 - 17.7 g/dL    Hematocrit 42.5 40.0 - 53.0 %    MCV 97 78 - 100 fL    MCH 33.5 (H) 26.5 - 33.0 pg    MCHC 34.6 31.5 - 36.5 g/dL    RDW 11.9 10.0 - 15.0 %    Platelet Count 210 150 - 450 10e3/uL    % Neutrophils 71 %    % Lymphocytes 19 %    % Monocytes 8 %    % Eosinophils 1 %    % Basophils 1 %    % Immature Granulocytes 0 %    NRBCs per  100 WBC 0 <1 /100    Absolute Neutrophils 6.1 1.6 - 8.3 10e3/uL    Absolute Lymphocytes 1.6 0.8 - 5.3 10e3/uL    Absolute Monocytes 0.7 0.0 - 1.3 10e3/uL    Absolute Eosinophils 0.1 0.0 - 0.7 10e3/uL    Absolute Basophils 0.0 0.0 - 0.2 10e3/uL    Absolute Immature Granulocytes 0.0 <=0.0 10e3/uL    Absolute NRBCs 0.0 10e3/uL   XR Chest Port 1 View    Narrative    PROCEDURE: XR CHEST PORT 1 VIEW 7/30/2021 3:18 PM    HISTORY: chest pain    COMPARISONS: None.    TECHNIQUE: Single portable view.    FINDINGS: Heart is not enlarged. Lungs are clear and no pleural  effusion is seen.         Impression    IMPRESSION: No acute infiltrate.    CLARK BOSS MD         SYSTEM ID:  [id number]       Medications - No data to display    Assessments & Plan (with Medical Decision Making)   Pt nontoxic in NAD. Heart, lung, bowel sounds normal, abd soft, nontender to palpation, nondistended. VSS, afebrile.  I did remove his shoes looked up on his feet, no obvious swelling that I see, no obvious swelling to his tongue as well.    EKG appears well, he has a negative troponin, normal BNP, no leukocytosis normal hemoglobin, urinalysis appears noninfectious, CMP unremarkable.  Chest x-ray appears very well.    Heart score: 3    Overall, the patient appears to be doing very well.  However, it is difficult to say what is causing symptoms at this time.  But, I am encouraged by the well appearance of the patient as well as his stable vital signs and reassuring diagnostic studies.  Appears to be a low risk for major adverse cardiac event in the near future.  He appears very stable and safe for discharge at this time, I did schedule stress echocardiogram as well as close follow-up with PCP for this coming week.    Strict return precautions are given to the pt, they will return if symptoms are worsening or concerning. The pt understands and agrees with the plan and they are discharged.     Anthony Robin PA-C    I have reviewed the nursing  notes.    I have reviewed the findings, diagnosis, plan and need for follow up with the patient.       HEART Score  Background  Calculates the overall risk of adverse event in patient's presenting with chest pain.  Based on 5 criteria (each assigned 0-2 points) including suspiciousness of history, EKG, age, risk factors and troponin.    Data  26 year old male  has Other male erectile dysfunction; NOAM (generalized anxiety disorder); Major depressive disorder, single episode, mild (H); Tobacco use disorder; Attention deficit hyperactivity disorder (ADHD); BMI (body mass index), pediatric, > 99% for age; Dysthymic disorder; Oppositional defiant disorder; Chronic recurrent pilonidal cyst without abscess; Morbid obesity (H); Essential hypertension; and Brain fog on their problem list.   reports that he has been smoking cigarettes. He started smoking about 7 years ago. He has been smoking about 0.25 packs per day. He has never used smokeless tobacco.  family history is not on file.  No results found for: TROPI  Criteria   0-2 points for each of 5 items (maximum of 10 points):  Score 1- History moderately suspicious for coronary syndrome  Score 0- EKG Normal  Score 0- Age <45 years old  Score 2- Three or more risk factors for or history of atherosclerotic disease  Score 0- Within normal limits for troponin levels  Interpretation  Risk of adverse outcome  Heart Score: 3  Total Score 0-3- Adverse Outcome Risk 2.5% - Supports early discharge with appropriate follow-up          New Prescriptions    No medications on file       Final diagnoses:   Chest pain       7/30/2021   [facility]  07/30/21 2313

## 2021-07-30 NOTE — TELEPHONE ENCOUNTER
Patient called and has concerns with his blood pressure that he just took blood pressure was 166/97. Ok to leave detailed message [phone].        Imani Flores on 7/30/2021 at 12:31 PM

## 2021-08-11 ENCOUNTER — TELEPHONE (OUTPATIENT)
Dept: FAMILY MEDICINE | Facility: OTHER | Age: 26
End: 2021-08-11

## 2021-08-11 ENCOUNTER — OFFICE VISIT (OUTPATIENT)
Dept: FAMILY MEDICINE | Facility: OTHER | Age: 26
End: 2021-08-11
Attending: FAMILY MEDICINE
Payer: COMMERCIAL

## 2021-08-11 VITALS
BODY MASS INDEX: 37.47 KG/M2 | OXYGEN SATURATION: 98 % | HEART RATE: 78 BPM | DIASTOLIC BLOOD PRESSURE: 86 MMHG | WEIGHT: 253 LBS | SYSTOLIC BLOOD PRESSURE: 136 MMHG | HEIGHT: 69 IN | RESPIRATION RATE: 16 BRPM | TEMPERATURE: 97.3 F

## 2021-08-11 DIAGNOSIS — R07.9 CHEST PAIN, UNSPECIFIED TYPE: Primary | ICD-10-CM

## 2021-08-11 DIAGNOSIS — F41.8 DEPRESSION WITH ANXIETY: ICD-10-CM

## 2021-08-11 DIAGNOSIS — N50.812 PAIN IN BOTH TESTICLES: ICD-10-CM

## 2021-08-11 DIAGNOSIS — N50.811 PAIN IN BOTH TESTICLES: ICD-10-CM

## 2021-08-11 LAB
ALBUMIN UR-MCNC: NEGATIVE MG/DL
APPEARANCE UR: CLEAR
BILIRUB UR QL STRIP: NEGATIVE
C TRACH DNA SPEC QL PROBE+SIG AMP: NEGATIVE
COLOR UR AUTO: NORMAL
GLUCOSE UR STRIP-MCNC: NEGATIVE MG/DL
HGB UR QL STRIP: NEGATIVE
KETONES UR STRIP-MCNC: NEGATIVE MG/DL
LEUKOCYTE ESTERASE UR QL STRIP: NEGATIVE
N GONORRHOEA DNA SPEC QL NAA+PROBE: NEGATIVE
NITRATE UR QL: NEGATIVE
PH UR STRIP: 5.5 [PH] (ref 5–9)
SP GR UR STRIP: 1.01 (ref 1–1.03)
UROBILINOGEN UR STRIP-MCNC: NORMAL MG/DL

## 2021-08-11 PROCEDURE — 81003 URINALYSIS AUTO W/O SCOPE: CPT | Mod: ZL | Performed by: FAMILY MEDICINE

## 2021-08-11 PROCEDURE — 87491 CHLMYD TRACH DNA AMP PROBE: CPT | Mod: ZL | Performed by: FAMILY MEDICINE

## 2021-08-11 PROCEDURE — 99214 OFFICE O/P EST MOD 30 MIN: CPT | Performed by: FAMILY MEDICINE

## 2021-08-11 RX ORDER — BUPROPION HYDROCHLORIDE 150 MG/1
150 TABLET ORAL EVERY MORNING
Qty: 90 TABLET | Refills: 0 | Status: SHIPPED | OUTPATIENT
Start: 2021-08-11 | End: 2022-03-25

## 2021-08-11 ASSESSMENT — PAIN SCALES - GENERAL: PAINLEVEL: NO PAIN (0)

## 2021-08-11 ASSESSMENT — PATIENT HEALTH QUESTIONNAIRE - PHQ9
10. IF YOU CHECKED OFF ANY PROBLEMS, HOW DIFFICULT HAVE THESE PROBLEMS MADE IT FOR YOU TO DO YOUR WORK, TAKE CARE OF THINGS AT HOME, OR GET ALONG WITH OTHER PEOPLE: VERY DIFFICULT
SUM OF ALL RESPONSES TO PHQ QUESTIONS 1-9: 12
SUM OF ALL RESPONSES TO PHQ QUESTIONS 1-9: 12

## 2021-08-11 ASSESSMENT — ANXIETY QUESTIONNAIRES
3. WORRYING TOO MUCH ABOUT DIFFERENT THINGS: NEARLY EVERY DAY
4. TROUBLE RELAXING: MORE THAN HALF THE DAYS
7. FEELING AFRAID AS IF SOMETHING AWFUL MIGHT HAPPEN: MORE THAN HALF THE DAYS
GAD7 TOTAL SCORE: 14
7. FEELING AFRAID AS IF SOMETHING AWFUL MIGHT HAPPEN: MORE THAN HALF THE DAYS
8. IF YOU CHECKED OFF ANY PROBLEMS, HOW DIFFICULT HAVE THESE MADE IT FOR YOU TO DO YOUR WORK, TAKE CARE OF THINGS AT HOME, OR GET ALONG WITH OTHER PEOPLE?: SOMEWHAT DIFFICULT
GAD7 TOTAL SCORE: 14
2. NOT BEING ABLE TO STOP OR CONTROL WORRYING: MORE THAN HALF THE DAYS
5. BEING SO RESTLESS THAT IT IS HARD TO SIT STILL: SEVERAL DAYS
1. FEELING NERVOUS, ANXIOUS, OR ON EDGE: MORE THAN HALF THE DAYS
GAD7 TOTAL SCORE: 14
6. BECOMING EASILY ANNOYED OR IRRITABLE: MORE THAN HALF THE DAYS

## 2021-08-11 ASSESSMENT — ENCOUNTER SYMPTOMS
FEVER: 0
CHILLS: 0
DYSURIA: 1

## 2021-08-11 ASSESSMENT — MIFFLIN-ST. JEOR: SCORE: 2116.94

## 2021-08-11 NOTE — NURSING NOTE
"Chief Complaint   Patient presents with     RECHECK     ER visit chest pain     Mass     left groin area     Establish Care         Initial /86   Pulse 78   Temp 97.3  F (36.3  C) (Temporal)   Resp 16   Ht 1.759 m (5' 9.25\")   Wt 114.8 kg (253 lb)   SpO2 98%   BMI 37.09 kg/m   Estimated body mass index is 37.09 kg/m  as calculated from the following:    Height as of this encounter: 1.759 m (5' 9.25\").    Weight as of this encounter: 114.8 kg (253 lb).     FOOD SECURITY SCREENING QUESTIONS  Hunger Vital Signs:  Within the past 12 months we worried whether our food would run out before we got money to buy more. Sometimes  Within the past 12 months the food we bought just didn't last and we didn't have money to get more. Sometimes      Medication Reconciliation: Complete      Norma J. Gosselin, LPN   "

## 2021-08-11 NOTE — PROGRESS NOTES
"Assessment & Plan     Chest pain, unspecified type  Emergency department records reviewed.  He has follow-up for stress test on August 26th at 2:00 PM.  Advised him to look out for a phone call regarding instructions.  Follow-up after test results available.  In the meant time, encouraged healthy low-sodium diet to promote weight loss.  Hold on exercise program until cardiac testing completed.  - Family Practice Referral    Pain in both testicles  Associated with dysuria.  Check urinalysis and GC/CT.  Testicular ultrasound ordered.  - US Testicular and Scrotum; Future  - UA reflex to Microscopic and Culture  - GC/Chlamydia by PCR    Depression with anxiety  Discussed SSRI/SNRI medications as first-line therapy for depression and anxiety due to their efficacy and safety for long-term use.  Counseled on potential adverse effects, trial and error nature of finding the right medication, and importance of consistent daily use due to onset of full effect after 4-6 weeks.  Pediatric records reviewed.  He has done well with Bupropion in the past.  Restart at 150 mg daily.  Reassess on follow-up in 6-8 weeks or sooner if needed.  - buPROPion (WELLBUTRIN XL) 150 MG 24 hr tablet; Take 1 tablet (150 mg) by mouth every morning    33 minutes spent on the date of the encounter doing chart review, history and exam, documentation and further activities per the note    Ashli Engle,   [facility]    Jose David Tinoco is a 26 year old who presents for the following health issues:    Answers for HPI/ROS submitted by the patient on 8/11/2021  If you checked off any problems, how difficult have these problems made it for you to do your work, take care of things at home, or get along with other people?: Very difficult  PHQ9 TOTAL SCORE: 12  NOAM 7 TOTAL SCORE: 14    HPI     He reports that he has been having \"spikes\" in his blood pressure despite being on medication.  He feels like his blood pressure is raised " "for no reason at all.  He has a history of anxiety in his teenage years.  He stopped taking the medication on his own and feels that he did well for \"quite a while.\"  He feels like his symptoms have returned.  He worries more than he used to.  He feels restless and his mind has felt \"foggy.\"  He has moments of panic which are not helped by his normal relaxing activities.  Blood pressure is elevated during these episodes which makes him even more anxious.  He also has a history of depression.  He feels this has returned recently as well due to having \"a lot going on, a lot on my plate.\"  He feels fatigued and a sense of loss of control.  He reports low mood and loss of interest in his activities.  He has to \"force\" himself to go through the day.  He reports thoughts of hurting himself last year but not so much this year.  He has had thoughts of worthlessness or that he would be better off dead.  He thinks this presented after his diagnosis of high blood pressure, which he does not feel is well controlled and is concerned that maybe he is having adverse effects from the medication.    Chest Pain:  He was evaluated in the emergency department, and it was suspected his symptoms were secondary to anxiety.  He has stress testing scheduled for further cardiac evaluation.    Testicular Mass:  He reports that he has noticed intermittent sensitivity and soreness of his testicles for the past couple months.  He has noticed a mass in his groin area a few weeks ago.  It has not changed in this time.  He reports that it is hard.  He does not know if it is mobile.  He denies any redness, swelling, or bruising.  He denies drainage.  He has four lifetime sexual partners, all female.  He has not used barrier contraception with his two most recent partners.    Review of Systems   Constitutional: Negative for chills and fever.   Genitourinary: Positive for dysuria and testicular pain. Negative for discharge.          Objective    BP " "136/86   Pulse 78   Temp 97.3  F (36.3  C) (Temporal)   Resp 16   Ht 1.759 m (5' 9.25\")   Wt 114.8 kg (253 lb)   SpO2 98%   BMI 37.09 kg/m    Body mass index is 37.09 kg/m .  Physical Exam  Constitutional:       General: He is not in acute distress.     Appearance: Normal appearance. He is not ill-appearing.   Cardiovascular:      Rate and Rhythm: Normal rate and regular rhythm.      Heart sounds: No murmur heard.   No friction rub. No gallop.    Pulmonary:      Effort: Pulmonary effort is normal.      Breath sounds: Normal breath sounds. No wheezing, rhonchi or rales.   Genitourinary:     Penis: Circumcised.       Testes:         Left: Tenderness present.      Epididymis:      Left: Tenderness present.      Comments: Unable to appreciate right testicle.  Neurological:      Mental Status: He is alert.           "

## 2021-08-11 NOTE — TELEPHONE ENCOUNTER
Tried calling patient but his mailbox is full and was unable to leave a message.  Mimi Burgos LPN .......  8/11/2021  11:47 AM

## 2021-08-12 ASSESSMENT — ANXIETY QUESTIONNAIRES: GAD7 TOTAL SCORE: 14

## 2021-08-19 ENCOUNTER — TELEPHONE (OUTPATIENT)
Dept: CARDIOLOGY | Facility: OTHER | Age: 26
End: 2021-08-19

## 2021-08-19 NOTE — TELEPHONE ENCOUNTER
After proper verification, patient was relayed results note to him.  Heidi Iglesias LPN on 8/19/2021 at 10:56 AM

## 2021-08-20 ENCOUNTER — HOSPITAL ENCOUNTER (OUTPATIENT)
Dept: ULTRASOUND IMAGING | Facility: OTHER | Age: 26
Discharge: HOME | End: 2021-08-20
Attending: FAMILY MEDICINE | Admitting: FAMILY MEDICINE
Payer: COMMERCIAL

## 2021-08-20 DIAGNOSIS — N50.811 PAIN IN BOTH TESTICLES: ICD-10-CM

## 2021-08-20 DIAGNOSIS — N50.812 PAIN IN BOTH TESTICLES: ICD-10-CM

## 2021-08-20 PROCEDURE — 76870 US EXAM SCROTUM: CPT

## 2021-08-24 ENCOUNTER — HOSPITAL ENCOUNTER (OUTPATIENT)
Dept: CARDIOLOGY | Facility: OTHER | Age: 26
Discharge: HOME | End: 2021-08-24
Attending: PHYSICIAN ASSISTANT | Admitting: PHYSICIAN ASSISTANT
Payer: COMMERCIAL

## 2021-08-24 VITALS
WEIGHT: 253 LBS | HEART RATE: 66 BPM | HEIGHT: 69 IN | BODY MASS INDEX: 37.47 KG/M2 | DIASTOLIC BLOOD PRESSURE: 84 MMHG | SYSTOLIC BLOOD PRESSURE: 120 MMHG

## 2021-08-24 DIAGNOSIS — R07.9 CHEST PAIN: ICD-10-CM

## 2021-08-24 PROCEDURE — 93017 CV STRESS TEST TRACING ONLY: CPT

## 2021-08-24 PROCEDURE — 93018 CV STRESS TEST I&R ONLY: CPT | Performed by: INTERNAL MEDICINE

## 2021-08-24 PROCEDURE — C8928 TTE W OR W/O FOL W/CON,STRES: HCPCS

## 2021-08-24 PROCEDURE — C8928 TTE W OR W/O FOL W/CON,STRES: HCPCS | Performed by: STUDENT IN AN ORGANIZED HEALTH CARE EDUCATION/TRAINING PROGRAM

## 2021-08-24 PROCEDURE — 93016 CV STRESS TEST SUPVJ ONLY: CPT | Performed by: INTERNAL MEDICINE

## 2021-08-24 PROCEDURE — 255N000002 HC RX 255 OP 636: Performed by: PHYSICIAN ASSISTANT

## 2021-08-24 PROCEDURE — 93321 DOPPLER ECHO F-UP/LMTD STD: CPT | Mod: 26 | Performed by: STUDENT IN AN ORGANIZED HEALTH CARE EDUCATION/TRAINING PROGRAM

## 2021-08-24 PROCEDURE — 999N000208 ECHO STRESS ECHOCARDIOGRAM

## 2021-08-24 RX ADMIN — PERFLUTREN 4 ML: 6.52 INJECTION, SUSPENSION INTRAVENOUS at 15:19

## 2021-08-24 ASSESSMENT — MIFFLIN-ST. JEOR: SCORE: 2112.98

## 2021-08-24 NOTE — PROGRESS NOTES
1345The patient arrived for a stress echo.  The procedure, risks and benefits were discussed and the consent was signed.  The patient was prepped for the stress test, and an IV was placed per procedure.  The echo sonographer did the initial images with definity for image enhancement.   Dr. Subramanian arrived, and the patient biked 12minutes and 45seconds.  The patient tolerated the procedure well.  Stress images were completed and the IV was removed per procedure.  The patient was released in stable condition.  The patient was instructed that the ordering MD will call with results in one to two days.The test results will also be CC to [facility]  If you have not received your results within 3 days please call the ordering provider.  Please see the chart for complete test results.

## 2021-10-09 ENCOUNTER — HEALTH MAINTENANCE LETTER (OUTPATIENT)
Age: 26
End: 2021-10-09

## 2021-10-20 ENCOUNTER — OFFICE VISIT (OUTPATIENT)
Dept: INTERNAL MEDICINE | Facility: OTHER | Age: 26
End: 2021-10-20
Attending: STUDENT IN AN ORGANIZED HEALTH CARE EDUCATION/TRAINING PROGRAM
Payer: COMMERCIAL

## 2021-10-20 VITALS
OXYGEN SATURATION: 97 % | WEIGHT: 249.8 LBS | SYSTOLIC BLOOD PRESSURE: 128 MMHG | BODY MASS INDEX: 36.89 KG/M2 | RESPIRATION RATE: 16 BRPM | TEMPERATURE: 98.8 F | DIASTOLIC BLOOD PRESSURE: 88 MMHG | HEART RATE: 89 BPM

## 2021-10-20 DIAGNOSIS — R42 DIZZINESS: Primary | ICD-10-CM

## 2021-10-20 DIAGNOSIS — R07.1 CHEST PAIN ON BREATHING: ICD-10-CM

## 2021-10-20 DIAGNOSIS — Z23 NEED FOR IMMUNIZATION AGAINST INFLUENZA: ICD-10-CM

## 2021-10-20 DIAGNOSIS — F17.200 TOBACCO USE DISORDER: ICD-10-CM

## 2021-10-20 PROCEDURE — 99406 BEHAV CHNG SMOKING 3-10 MIN: CPT | Mod: 25 | Performed by: STUDENT IN AN ORGANIZED HEALTH CARE EDUCATION/TRAINING PROGRAM

## 2021-10-20 PROCEDURE — 90471 IMMUNIZATION ADMIN: CPT | Performed by: STUDENT IN AN ORGANIZED HEALTH CARE EDUCATION/TRAINING PROGRAM

## 2021-10-20 PROCEDURE — 90686 IIV4 VACC NO PRSV 0.5 ML IM: CPT | Performed by: STUDENT IN AN ORGANIZED HEALTH CARE EDUCATION/TRAINING PROGRAM

## 2021-10-20 PROCEDURE — 99213 OFFICE O/P EST LOW 20 MIN: CPT | Mod: 25 | Performed by: STUDENT IN AN ORGANIZED HEALTH CARE EDUCATION/TRAINING PROGRAM

## 2021-10-20 RX ORDER — MECLIZINE HYDROCHLORIDE 25 MG/1
25 TABLET ORAL 3 TIMES DAILY PRN
Qty: 90 TABLET | Refills: 3 | Status: SHIPPED | OUTPATIENT
Start: 2021-10-20 | End: 2022-03-25

## 2021-10-20 ASSESSMENT — ANXIETY QUESTIONNAIRES
6. BECOMING EASILY ANNOYED OR IRRITABLE: MORE THAN HALF THE DAYS
5. BEING SO RESTLESS THAT IT IS HARD TO SIT STILL: SEVERAL DAYS
GAD7 TOTAL SCORE: 15
1. FEELING NERVOUS, ANXIOUS, OR ON EDGE: SEVERAL DAYS
GAD7 TOTAL SCORE: 15
GAD7 TOTAL SCORE: 15
3. WORRYING TOO MUCH ABOUT DIFFERENT THINGS: NEARLY EVERY DAY
7. FEELING AFRAID AS IF SOMETHING AWFUL MIGHT HAPPEN: NEARLY EVERY DAY
8. IF YOU CHECKED OFF ANY PROBLEMS, HOW DIFFICULT HAVE THESE MADE IT FOR YOU TO DO YOUR WORK, TAKE CARE OF THINGS AT HOME, OR GET ALONG WITH OTHER PEOPLE?: VERY DIFFICULT
7. FEELING AFRAID AS IF SOMETHING AWFUL MIGHT HAPPEN: NEARLY EVERY DAY
4. TROUBLE RELAXING: MORE THAN HALF THE DAYS
2. NOT BEING ABLE TO STOP OR CONTROL WORRYING: NEARLY EVERY DAY

## 2021-10-20 ASSESSMENT — ENCOUNTER SYMPTOMS
HEADACHES: 0
CHEST TIGHTNESS: 1
UNEXPECTED WEIGHT CHANGE: 0
ABDOMINAL PAIN: 0
COUGH: 0
SPEECH DIFFICULTY: 0
LIGHT-HEADEDNESS: 0
PALPITATIONS: 0
WEAKNESS: 0
SHORTNESS OF BREATH: 1
NUMBNESS: 0
WHEEZING: 0
DIZZINESS: 1

## 2021-10-20 ASSESSMENT — PAIN SCALES - GENERAL: PAINLEVEL: NO PAIN (0)

## 2021-10-20 NOTE — NURSING NOTE
Immunization Documentation    Prior to Immunization administration, verified patients identity using patient's name and date of birth. Please see IMMUNIZATIONS  and order for additional information.  Patient / Parent instructed to remain in clinic for 15 minutes and report any adverse reaction to staff immediately.    Was entire vial of medication used? Yes  Vial/Syringe: Syringe    Idania Chin LPN  10/20/2021   3:46 PM

## 2021-10-20 NOTE — PROGRESS NOTES
"Mr. Richardson is a 26 year old male who presents to the clinic for dizziness and chest pain    HPI     Randomly has feeling like floor is moving  Having trouble keeping his balance  Fatigued recently.   Dizziness there for a short time.   Episodes may last an entire day, may be a few times per day.   Keeping busy helps with symptoms.   Nothing OTC for it.       Shoulder discomfort in upper back  Some days feels worse than others.       Smoking currently. Around 5 cigarettes per day.   Wants to quit  Tried gum.   Tried nicotine pouches. This has helped.       Breathing problems:   Develops chest pain at times.   Feels like he has to cough and has the symptom improve  Can be assocaited with dizziness      Review of Systems   Constitutional: Negative for unexpected weight change.   Respiratory: Positive for chest tightness and shortness of breath. Negative for cough and wheezing.    Cardiovascular: Negative for chest pain and palpitations.   Gastrointestinal: Negative for abdominal pain.   Neurological: Positive for dizziness. Negative for syncope, speech difficulty, weakness, light-headedness, numbness and headaches.        Reviewed and updated as needed this visit by Provider                  EXAM:   Vitals:    10/20/21 1512   BP: 128/88   BP Location: Right arm   Patient Position: Sitting   Cuff Size: Adult Regular   Pulse: 89   Resp: 16   Temp: 98.8  F (37.1  C)   TempSrc: Tympanic   SpO2: 97%   Weight: 113.3 kg (249 lb 12.8 oz)         BP Readings from Last 3 Encounters:   10/20/21 128/88   08/24/21 120/84   08/11/21 136/86      Wt Readings from Last 3 Encounters:   10/20/21 113.3 kg (249 lb 12.8 oz)   08/24/21 114.8 kg (253 lb)   08/11/21 114.8 kg (253 lb)      Estimated body mass index is 36.89 kg/m  as calculated from the following:    Height as of 8/24/21: 1.753 m (5' 9\").    Weight as of this encounter: 113.3 kg (249 lb 12.8 oz).     Physical Exam  Vitals and nursing note reviewed.   Constitutional:       " Appearance: Normal appearance. He is obese.   HENT:      Head: Normocephalic and atraumatic.      Comments: Some clear fluid worse behind the left tympanic membrane than the right tympanic membrane.     Right Ear: Tympanic membrane normal.      Left Ear: Tympanic membrane normal.   Neurological:      Mental Status: He is alert.          INVESTIGATIONS:  - Labs reviewed in [facility]     ASSESSMENT AND PLAN:    ICD-10-CM    1. Tobacco use disorder  F17.200 nicotine (NICODERM CQ) 7 MG/24HR 24 hr patch   2. Need for immunization against influenza  Z23 FLU SHOT 6 MOS - 50 YRS (FLUZONE)   3. Major depressive disorder, single episode, mild (H)  F32.0    4. Dizziness  R42 meclizine (ANTIVERT) 25 MG tablet       Assessment/Plan:     Tobacco use disorder: Patient interested in quitting smoking.  Currently using nicotine lozenges which seemed to help.  Smoking 5 cigarettes/day but discussed complete cessation and he is going to work on this.  He is also needs his girlfriend to stop smoking as well.  Discussed doing this as a team.  Prescribed nicotine patches and to pick a quit date as he is already on bupropion.  Time on smoking cessation counseling 6 minutes    Flu shot today    Chest pain: Recently had stress test and pain associated with worsening breathing.  Suspect that it is due to pneumonitis caused by smoking as the symptoms improve when he is not smoking and is improved with coughing.  Encouraged cessation from smoking and follow-up in 2 to 3 months and would order PFTs.  Unlabored breathing today.    Dizziness: Feels like the floor is moving.  There is some fluid behind his tympanic membrane which does not appear infected at this time.  We discussed the etiology of dizziness likely secondary to labyrinthitis and recent viral URI.  We will try a short course of meclizine and if symptoms persist would pursue imaging versus referral to the [facility].      Follow-up with me in 2 to 3 months for  follow-up, recommend PFTs at that time ideally after he smoked stop smoking.        Electronically signed by:  Dg Ferraro MD on 10/20/2021  Internal Medicine  [facility]

## 2021-10-20 NOTE — PROGRESS NOTES
Medication Reconciliation: complete   Advance Care Directive Reviewed    FOOD SECURITY SCREENING QUESTIONS  Hunger Vital Signs:  Within the past 12 months we worried whether our food would run out before we got money to buy more. Never  Within the past 12 months the food we bought just didn't last and we didn't have money to get more. Never  Idania Chin LPN .............10/20/2021  3:19 PM      Answers for HPI/ROS submitted by the patient on 10/20/2021  If you checked off any problems, how difficult have these problems made it for you to do your work, take care of things at home, or get along with other people?: Very difficult  PHQ9 TOTAL SCORE: 12  NOAM 7 TOTAL SCORE: 15

## 2021-10-21 ASSESSMENT — PATIENT HEALTH QUESTIONNAIRE - PHQ9: SUM OF ALL RESPONSES TO PHQ QUESTIONS 1-9: 12

## 2021-10-21 ASSESSMENT — ANXIETY QUESTIONNAIRES: GAD7 TOTAL SCORE: 15

## 2021-11-13 ENCOUNTER — ALLIED HEALTH/NURSE VISIT (OUTPATIENT)
Dept: FAMILY MEDICINE | Facility: OTHER | Age: 26
End: 2021-11-13
Payer: COMMERCIAL

## 2021-11-13 DIAGNOSIS — Z20.822 EXPOSURE TO 2019 NOVEL CORONAVIRUS: Primary | ICD-10-CM

## 2021-11-13 PROCEDURE — C9803 HOPD COVID-19 SPEC COLLECT: HCPCS

## 2021-11-13 PROCEDURE — U0005 INFEC AGEN DETEC AMPLI PROBE: HCPCS | Mod: ZL

## 2021-11-15 LAB — SARS-COV-2 RNA RESP QL NAA+PROBE: NEGATIVE

## 2021-12-04 ENCOUNTER — HEALTH MAINTENANCE LETTER (OUTPATIENT)
Age: 26
End: 2021-12-04

## 2022-01-21 ENCOUNTER — ALLIED HEALTH/NURSE VISIT (OUTPATIENT)
Dept: FAMILY MEDICINE | Facility: OTHER | Age: 27
End: 2022-01-21
Attending: FAMILY MEDICINE
Payer: COMMERCIAL

## 2022-01-21 DIAGNOSIS — M79.10 MUSCLE PAIN: ICD-10-CM

## 2022-01-21 DIAGNOSIS — R50.9 SUBJECTIVE FEVER: Primary | ICD-10-CM

## 2022-01-21 DIAGNOSIS — R05.9 COUGH: ICD-10-CM

## 2022-01-21 PROCEDURE — U0005 INFEC AGEN DETEC AMPLI PROBE: HCPCS | Mod: ZL

## 2022-01-21 PROCEDURE — C9803 HOPD COVID-19 SPEC COLLECT: HCPCS

## 2022-01-21 NOTE — PROGRESS NOTES
Patient swabbed for COVID-19 testing.  Rut Chao MA on 1/21/2022 at 1:59 PM  Body aches fever and chills

## 2022-01-24 LAB — SARS-COV-2 RNA RESP QL NAA+PROBE: NOT DETECTED

## 2022-03-25 ENCOUNTER — OFFICE VISIT (OUTPATIENT)
Dept: FAMILY MEDICINE | Facility: OTHER | Age: 27
End: 2022-03-25
Attending: STUDENT IN AN ORGANIZED HEALTH CARE EDUCATION/TRAINING PROGRAM
Payer: COMMERCIAL

## 2022-03-25 VITALS
HEIGHT: 69 IN | SYSTOLIC BLOOD PRESSURE: 124 MMHG | TEMPERATURE: 97.8 F | DIASTOLIC BLOOD PRESSURE: 74 MMHG | RESPIRATION RATE: 20 BRPM | WEIGHT: 259.13 LBS | OXYGEN SATURATION: 97 % | HEART RATE: 78 BPM | BODY MASS INDEX: 38.38 KG/M2

## 2022-03-25 DIAGNOSIS — F33.1 MODERATE EPISODE OF RECURRENT MAJOR DEPRESSIVE DISORDER (H): ICD-10-CM

## 2022-03-25 DIAGNOSIS — R22.1 NECK MASS: Primary | ICD-10-CM

## 2022-03-25 DIAGNOSIS — F41.1 GAD (GENERALIZED ANXIETY DISORDER): ICD-10-CM

## 2022-03-25 DIAGNOSIS — H61.21 IMPACTED CERUMEN OF RIGHT EAR: ICD-10-CM

## 2022-03-25 DIAGNOSIS — R45.851 SUICIDAL IDEATION: ICD-10-CM

## 2022-03-25 LAB — TSH SERPL DL<=0.005 MIU/L-ACNC: 1.39 MU/L (ref 0.4–4)

## 2022-03-25 PROCEDURE — 36415 COLL VENOUS BLD VENIPUNCTURE: CPT | Mod: ZL | Performed by: STUDENT IN AN ORGANIZED HEALTH CARE EDUCATION/TRAINING PROGRAM

## 2022-03-25 PROCEDURE — 84443 ASSAY THYROID STIM HORMONE: CPT | Mod: ZL | Performed by: STUDENT IN AN ORGANIZED HEALTH CARE EDUCATION/TRAINING PROGRAM

## 2022-03-25 PROCEDURE — 99417 PROLNG OP E/M EACH 15 MIN: CPT | Performed by: STUDENT IN AN ORGANIZED HEALTH CARE EDUCATION/TRAINING PROGRAM

## 2022-03-25 PROCEDURE — 99215 OFFICE O/P EST HI 40 MIN: CPT | Performed by: STUDENT IN AN ORGANIZED HEALTH CARE EDUCATION/TRAINING PROGRAM

## 2022-03-25 RX ORDER — BUPROPION HYDROCHLORIDE 150 MG/1
150 TABLET ORAL EVERY MORNING
Qty: 30 TABLET | Refills: 1 | Status: SHIPPED | OUTPATIENT
Start: 2022-03-25 | End: 2022-04-08

## 2022-03-25 ASSESSMENT — COLUMBIA-SUICIDE SEVERITY RATING SCALE - C-SSRS
3. IN THE PAST MONTH, HAVE YOU BEEN THINKING ABOUT HOW YOU MIGHT KILL YOURSELF?: YES
4. IN THE PAST MONTH, HAVE YOU HAD THESE THOUGHTS AND HAD SOME INTENTION OF ACTING ON THEM?: YES
6. HAVE YOU EVER DONE ANYTHING, STARTED TO DO ANYTHING, OR PREPARED TO DO ANYTHING TO END YOUR LIFE?: NO
2. IN THE PAST MONTH, HAVE YOU ACTUALLY HAD ANY THOUGHTS OF KILLING YOURSELF?: YES
3. IN THE PAST MONTH, HAVE YOU BEEN THINKING ABOUT HOW YOU MIGHT KILL YOURSELF?: YES
1. WITHIN THE PAST MONTH, HAVE YOU WISHED YOU WERE DEAD OR WISHED YOU COULD GO TO SLEEP AND NOT WAKE UP?: YES
5. IN THE PAST MONTH, HAVE YOU STARTED TO WORK OUT OR WORKED OUT THE DETAILS OF HOW TO KILL YOURSELF? DO YOU INTEND TO CARRY OUT THIS PLAN?: YES
5. IN THE PAST MONTH, HAVE YOU STARTED TO WORK OUT OR WORKED OUT THE DETAILS OF HOW TO KILL YOURSELF? DO YOU INTEND TO CARRY OUT THIS PLAN?: NO
4. IN THE PAST MONTH, HAVE YOU HAD THESE THOUGHTS AND HAD SOME INTENTION OF ACTING ON THEM?: YES
6. HAVE YOU EVER DONE ANYTHING, STARTED TO DO ANYTHING, OR PREPARED TO DO ANYTHING TO END YOUR LIFE?: NO
1. WITHIN THE PAST MONTH, HAVE YOU WISHED YOU WERE DEAD OR WISHED YOU COULD GO TO SLEEP AND NOT WAKE UP?: YES
2. IN THE PAST MONTH, HAVE YOU ACTUALLY HAD ANY THOUGHTS OF KILLING YOURSELF?: YES

## 2022-03-25 ASSESSMENT — ANXIETY QUESTIONNAIRES
7. FEELING AFRAID AS IF SOMETHING AWFUL MIGHT HAPPEN: NEARLY EVERY DAY
GAD7 TOTAL SCORE: 16
6. BECOMING EASILY ANNOYED OR IRRITABLE: NEARLY EVERY DAY
GAD7 TOTAL SCORE: 16
2. NOT BEING ABLE TO STOP OR CONTROL WORRYING: NEARLY EVERY DAY
3. WORRYING TOO MUCH ABOUT DIFFERENT THINGS: NEARLY EVERY DAY
5. BEING SO RESTLESS THAT IT IS HARD TO SIT STILL: NOT AT ALL
7. FEELING AFRAID AS IF SOMETHING AWFUL MIGHT HAPPEN: NEARLY EVERY DAY
1. FEELING NERVOUS, ANXIOUS, OR ON EDGE: MORE THAN HALF THE DAYS
4. TROUBLE RELAXING: MORE THAN HALF THE DAYS
GAD7 TOTAL SCORE: 16

## 2022-03-25 ASSESSMENT — PATIENT HEALTH QUESTIONNAIRE - PHQ9
SUM OF ALL RESPONSES TO PHQ QUESTIONS 1-9: 16
SUM OF ALL RESPONSES TO PHQ QUESTIONS 1-9: 16
10. IF YOU CHECKED OFF ANY PROBLEMS, HOW DIFFICULT HAVE THESE PROBLEMS MADE IT FOR YOU TO DO YOUR WORK, TAKE CARE OF THINGS AT HOME, OR GET ALONG WITH OTHER PEOPLE: SOMEWHAT DIFFICULT

## 2022-03-25 ASSESSMENT — PAIN SCALES - GENERAL: PAINLEVEL: NO PAIN (0)

## 2022-03-25 NOTE — PROGRESS NOTES
Assessment & Plan   Suicidal ideation  Moderate episode of recurrent major depressive disorder (H)  NOAM (generalized anxiety disorder)  In the last month had felt he was better off dead and thought about ways to kill self. He will not tell how he had planned to. He feels safe at home with his partner, and she feels safe with him there. Discussed safety plan including removing guns and medications which they agree to. Given crisis number to call if this happens again. Previously did well on wellbutrin for mood, low libido is a concern, with the anxiety and depression. Given information on therapy, he will consider calling, cost is a limiting factor for him.   - buPROPion (WELLBUTRIN XL) 150 MG 24 hr tablet; Take 1 tablet (150 mg) by mouth every morning    Neck mass  Likely reactive lymph node. Does smoke daily so certainly would want to rule out an unlikely malignancy. Thyroid normal to palpation on exam, normal TSH today.   - TSH Reflex GH; Future  - US Head Neck Soft Tissue; Future  - TSH Reflex GH        Tobacco Cessation:   reports that he has been smoking cigarettes. He started smoking about 8 years ago. He has a 3.00 pack-year smoking history. He has never used smokeless tobacco.  Tobacco Cessation Action Plan: wants to quit, tried nicotine patch and didnt like how it felt. trying to quit on own      Depression Screening Follow Up    PHQ 3/25/2022   PHQ-9 Total Score 16   Q9: Thoughts of better off dead/self-harm past 2 weeks Several days   F/U: Thoughts of suicide or self-harm No   F/U: Safety concerns No     Last PHQ-9 3/25/2022   1.  Little interest or pleasure in doing things 1   2.  Feeling down, depressed, or hopeless 3   3.  Trouble falling or staying asleep, or sleeping too much 1   4.  Feeling tired or having little energy 3   5.  Poor appetite or overeating 3   6.  Feeling bad about yourself 3   7.  Trouble concentrating 1   8.  Moving slowly or restless 0   Q9: Thoughts of better off dead/self-harm  "past 2 weeks 1   PHQ-9 Total Score 16   Difficulty at work, home, or with people -   In the past two weeks have you had thoughts of suicide or self harm? No   Do you have concerns about your personal safety or the safety of others? No           Follow Up    Crisis resource information provided in the After Visit Summary  close weekly follow up    Discussed the following ways the patient can remain in a safe environment:  remove alcohol, remove drugs, secure medications: any, dispose of old medications  and remove access to firearms: remove from property   At this point he has no active SI or plan so safe to discharge home    Total time spent spent with this patient was >60 minutes which included chart review, patient education, procedures, visualization and interpretation of labs/images      Jamie Echavarria MD  [facility]    Jose David Tinoco is a 26 year old who presents for the following health issues  accompanied by his fiance.  Patient presents to clinic experiencing depression, anger, mood swings, fatigue and anxiety.  He states, \"like the voice in my head says I am not good enough, people are talking about me.\"    Also, he believes left side neck/lymphnode is swollen.    HPI     Left neck swollen   - off and on for a few months   - non tender small mass that comes and goes just below the left angle of his jaw  - no recent illness, no tooth pain, no neck pain   - has never had this before  - no ear pain    Depression and anxiety  - as a young teen was diagnosed with depression  - was on meds for a while in teens and later  - about 6 years has not needed meds  - feels like recent stress in life is making it to the point that he wants help   - feels unmotivated, depressed, tired despite adequate sleep, feels not good enough, worried people are talking about him, thinks he would be better off alone  - no voices/hallucinations but does worry that people are talking about him or worries " "he's not good enough, feels like he tells himself he is not good enough   - has had thoughts to kill self in the past, at one point had a plan be declines to tell me about it. Right now does not have thoughts or plans to hurt or kill self                 Review of Systems   Constitutional, HEENT, cardiovascular, pulmonary, gi and gu systems are negative, except as otherwise noted.      Objective    /74 (BP Location: Right arm, Patient Position: Sitting, Cuff Size: Adult Large)   Pulse 78   Temp 97.8  F (36.6  C) (Tympanic)   Resp 20   Ht 1.753 m (5' 9\")   Wt 117.5 kg (259 lb 2 oz)   SpO2 97%   BMI 38.27 kg/m    Body mass index is 38.27 kg/m .  Physical Exam   GENERAL: healthy, alert and no distress  EYES: Eyes grossly normal to inspection, PERRL and conjunctivae and sclerae normal  HENT: ear canals and TM's normal, nose and mouth without ulcers or lesions  NECK: normal thyroid. Left submandibular lymphadenopathy   RESP: lungs clear to auscultation - no rales, rhonchi or wheezes  CV: regular rate and rhythm, normal S1 S2, no S3 or S4, no murmur, click or rub, no peripheral edema and peripheral pulses strong  MS: no gross musculoskeletal defects noted, no edema  PSYCH: mentation appears normal, affect normal/bright    No results found for this or any previous visit (from the past 24 hour(s)).            Answers for HPI/ROS submitted by the patient on 3/25/2022  If you checked off any problems, how difficult have these problems made it for you to do your work, take care of things at home, or get along with other people?: Somewhat difficult  PHQ9 TOTAL SCORE: 16  NOAM 7 TOTAL SCORE: 16      "

## 2022-03-25 NOTE — PATIENT INSTRUCTIONS
Mental Health Providers    [facility] ([facility]): [phone], multiple providers for therapy, diagnostic assessments with Dr. Jose D Myles, Dr. Dorys Winslow    [facility]: [phone], multiple providers for therapy, diagnostic assessments, medication management, [facility]/shelter    [facility]: [phone], multiple providers for therapy, diagnostic assessments    [facility] [place] [facility] [phone]    [facility]: [phone], multiple providers for therapy    [facility]: [phone],or [phone] India Lucero, therapy for children, adolescents   and adults  [facility] [phone], [email or web address]    [facility]: [phone], multiple providers for child, adolescent and adult therapy services, med management    [phone], Charlotte Dawkins, therapy for children, adolescents and adults    [facility]: [phone], multiple providers for therapy for adolescents and adults    [facility]: [phone], counseling for children, adults and family    Stormy Heademi:[phone], counseling for adults and adolescents with anxiety, depression, grief, EMDR and relationship issues    Dain Howard:[phone], individual counseling, diagnostic assessments    [facility]: [phone], Francisco Churchill, [facility], ages 5 and up, medication management, family therapy    [facility], alcohol and drug counseling    [facility]: [phone], individual and family counseling, medication management    [facility]: [phone], chemical dependency for adolescents and adults, inpatient and outpatient programs    [facility]: [phone]: individual counseling for adults and adolescents 13 and up.    [facility]: [phone], Libby HEBERT, counseling, diagnostic  assessments, medication management    [facility]: [phone], emphasis on evaluation/diagnostic services as well as individual, family and couple counseling     Ashia Smith [phone]      Out of Area    [facility] [phone]    [facility] [phone]    [facility] [phone]  As of 7/2019    CRISIS RESPONSE TEAM (CRT)/FIRST CALL FOR HELP  [phone] OR [phone]    [facility]  [phone]    Crisis Text Line  http://www.crisistextline.org  The Crisis Text Line serves anyone, in any type of crisis, providing access free, 24/7 support and information.  Text HOME to [phone] from anywhere in the [place]

## 2022-03-25 NOTE — NURSING NOTE
"Patient presents to clinic experiencing depression, anger, mood swings, fatigue and anxiety.  He states, \"like the voice in my head says I am not good enough, people are talking about me.\"    Also, he believes left side neck/lymphnode is swollen.    Medication Rec Complete  Dara Montoya LPN............3/25/2022 12:41 PM    "

## 2022-03-26 ASSESSMENT — PATIENT HEALTH QUESTIONNAIRE - PHQ9: SUM OF ALL RESPONSES TO PHQ QUESTIONS 1-9: 16

## 2022-03-26 ASSESSMENT — ANXIETY QUESTIONNAIRES: GAD7 TOTAL SCORE: 16

## 2022-04-08 ENCOUNTER — OFFICE VISIT (OUTPATIENT)
Dept: FAMILY MEDICINE | Facility: OTHER | Age: 27
End: 2022-04-08
Attending: STUDENT IN AN ORGANIZED HEALTH CARE EDUCATION/TRAINING PROGRAM
Payer: COMMERCIAL

## 2022-04-08 VITALS
RESPIRATION RATE: 20 BRPM | OXYGEN SATURATION: 97 % | TEMPERATURE: 98.2 F | WEIGHT: 259.2 LBS | SYSTOLIC BLOOD PRESSURE: 142 MMHG | DIASTOLIC BLOOD PRESSURE: 80 MMHG | HEART RATE: 76 BPM | BODY MASS INDEX: 38.28 KG/M2

## 2022-04-08 DIAGNOSIS — J06.9 VIRAL URI: ICD-10-CM

## 2022-04-08 DIAGNOSIS — I10 ESSENTIAL HYPERTENSION: ICD-10-CM

## 2022-04-08 DIAGNOSIS — F33.1 MODERATE EPISODE OF RECURRENT MAJOR DEPRESSIVE DISORDER (H): ICD-10-CM

## 2022-04-08 DIAGNOSIS — F41.1 GAD (GENERALIZED ANXIETY DISORDER): Primary | ICD-10-CM

## 2022-04-08 PROCEDURE — 99214 OFFICE O/P EST MOD 30 MIN: CPT | Performed by: STUDENT IN AN ORGANIZED HEALTH CARE EDUCATION/TRAINING PROGRAM

## 2022-04-08 RX ORDER — BUPROPION HYDROCHLORIDE 150 MG/1
150 TABLET ORAL EVERY MORNING
Qty: 90 TABLET | Refills: 3 | Status: SHIPPED | OUTPATIENT
Start: 2022-04-08 | End: 2022-11-17

## 2022-04-08 ASSESSMENT — ANXIETY QUESTIONNAIRES
2. NOT BEING ABLE TO STOP OR CONTROL WORRYING: SEVERAL DAYS
GAD7 TOTAL SCORE: 9
7. FEELING AFRAID AS IF SOMETHING AWFUL MIGHT HAPPEN: MORE THAN HALF THE DAYS
5. BEING SO RESTLESS THAT IT IS HARD TO SIT STILL: NOT AT ALL
1. FEELING NERVOUS, ANXIOUS, OR ON EDGE: MORE THAN HALF THE DAYS
GAD7 TOTAL SCORE: 9
6. BECOMING EASILY ANNOYED OR IRRITABLE: SEVERAL DAYS
4. TROUBLE RELAXING: SEVERAL DAYS
GAD7 TOTAL SCORE: 9
7. FEELING AFRAID AS IF SOMETHING AWFUL MIGHT HAPPEN: MORE THAN HALF THE DAYS
3. WORRYING TOO MUCH ABOUT DIFFERENT THINGS: MORE THAN HALF THE DAYS

## 2022-04-08 ASSESSMENT — PATIENT HEALTH QUESTIONNAIRE - PHQ9
10. IF YOU CHECKED OFF ANY PROBLEMS, HOW DIFFICULT HAVE THESE PROBLEMS MADE IT FOR YOU TO DO YOUR WORK, TAKE CARE OF THINGS AT HOME, OR GET ALONG WITH OTHER PEOPLE: SOMEWHAT DIFFICULT
SUM OF ALL RESPONSES TO PHQ QUESTIONS 1-9: 10
SUM OF ALL RESPONSES TO PHQ QUESTIONS 1-9: 10

## 2022-04-08 ASSESSMENT — PAIN SCALES - GENERAL: PAINLEVEL: MILD PAIN (2)

## 2022-04-08 NOTE — PROGRESS NOTES
Assessment & Plan     NOAM (generalized anxiety disorder)  Moderate episode of recurrent major depressive disorder (H)  Mood feels improved, no SI. He feels stable at the current dose and does not want to increase. Refills sent.   - buPROPion (WELLBUTRIN XL) 150 MG 24 hr tablet; Take 1 tablet (150 mg) by mouth every morning    Viral URI  Cold symptoms, history, exam, and timeline consistent with a viral URI. Reviewed supportive cares at home with OTC cold meds, humidified air    Essential hypertension  Takes losartan 100 mg daily. Today BP elevated to 142/80, but also does not feel well from the cold as above. Asymptomatic. Continue current dose of losartan for now--He will check his BPs at home with home cuff and return to clinic if consistently >130/80 or if he becomes symptomatic       No follow-ups on file.    Jamie Echavarria MD  [facility]    Subjective   Earnest is a 26 year old who presents for the following health issues depression ,anxiety.    History of Present Illness       Mental Health Follow-up:  Patient presents to follow-up on Depression & Anxiety.Patient's depression since last visit has been:  Medium  The patient is not having other symptoms associated with depression.  Patient's anxiety since last visit has been:  Medium  The patient is not having other symptoms associated with anxiety.  Any significant life events: job concerns, financial concerns and health concerns  Patient is not feeling anxious or having panic attacks.  Patient has no concerns about alcohol or drug use.       Today's PHQ-9         PHQ-9 Total Score: 10  PHQ-9 Q9 Thoughts of better off dead/self-harm past 2 weeks :   (P) Not at all    How difficult have these problems made it for you to do your work, take care of things at home, or get along with other people: Somewhat difficult    Today's NOAM-7 Score: 9    Reason for visit:  Anxiety/depression breathing problems swollen lymph nodes  Symptom onset:   More than a month  Symptoms include:  Irritability feeling swollen lympnodes  depressed sluggish no motivation  Symptom intensity:  Moderate  Symptom progression:  Worsening  Had these symptoms before:  Yes  Has tried/received treatment for these symptoms:  Yes  Previous treatment was successful:  Yes  Prior treatment description:  N/a  What makes it worse:  Not really just happens day to day sometimes better sometimes worse  What makes it better:  Family doing things to keep my mind occupied    He eats 2-3 servings of fruits and vegetables daily.He consumes 1 sweetened beverage(s) daily.He exercises with enough effort to increase his heart rate 10 to 19 minutes per day.  He exercises with enough effort to increase his heart rate 3 or less days per week. He is missing 1 dose(s) of medications per week.       Med check   - 2 weeks ago started Wellbutrin for anxiety, depression  - mood feels improved  - no SI   - tolerating med well        Cold symptoms   - last few days developed sinus pressure, congestion, cough  - has not tried anything for the cold yet          Review of Systems   Constitutional, HEENT, cardiovascular, pulmonary, gi and gu systems are negative, except as otherwise noted.      Objective    BP (!) 142/80   Pulse 76   Temp 98.2  F (36.8  C)   Resp 20   Wt 117.6 kg (259 lb 3.2 oz)   SpO2 97%   BMI 38.28 kg/m    Body mass index is 38.28 kg/m .  Physical Exam   GENERAL: healthy, alert and no distress  EYES: Eyes grossly normal to inspection, PERRL and conjunctivae and sclerae normal  HENT: ear canals and TM's normal, nose and mouth without ulcers or lesions  NECK: no adenopathy, no asymmetry, masses, or scars and thyroid normal to palpation  RESP: lungs clear to auscultation - no rales, rhonchi or wheezes  CV: regular rate and rhythm, normal S1 S2, no S3 or S4, no murmur, click or rub, no peripheral edema and peripheral pulses strong  MS: no gross musculoskeletal defects noted, no edema  PSYCH:  mentation appears normal, affect normal/bright

## 2022-04-09 ASSESSMENT — PATIENT HEALTH QUESTIONNAIRE - PHQ9: SUM OF ALL RESPONSES TO PHQ QUESTIONS 1-9: 10

## 2022-04-09 ASSESSMENT — ANXIETY QUESTIONNAIRES: GAD7 TOTAL SCORE: 9

## 2022-04-11 ENCOUNTER — HOSPITAL ENCOUNTER (OUTPATIENT)
Dept: ULTRASOUND IMAGING | Facility: OTHER | Age: 27
Discharge: HOME | End: 2022-04-11
Attending: STUDENT IN AN ORGANIZED HEALTH CARE EDUCATION/TRAINING PROGRAM | Admitting: STUDENT IN AN ORGANIZED HEALTH CARE EDUCATION/TRAINING PROGRAM
Payer: COMMERCIAL

## 2022-04-11 DIAGNOSIS — R22.1 NECK MASS: ICD-10-CM

## 2022-04-11 PROCEDURE — 76536 US EXAM OF HEAD AND NECK: CPT

## 2022-08-10 ENCOUNTER — PATIENT OUTREACH (OUTPATIENT)
Dept: FAMILY MEDICINE | Facility: OTHER | Age: 27
End: 2022-08-10

## 2022-08-10 NOTE — TELEPHONE ENCOUNTER
Patient Quality Outreach    Patient is due for the following:   Hypertension -  BP check and Hypertension follow-up visit  Colon Cancer Screening  Physical Preventive Adult Physical    Next Steps:   Schedule a nurse only visit for blood pressure and  Adult Preventative    Type of outreach:    Sent letter.      Questions for provider review:    None     Cinthya Serna

## 2022-08-10 NOTE — LETTER
[facility]  [address]  [place] [address]  [phone]       August 10, 2022    Earnest Richardson  [address]   [place] [address]    Dear Earnest,    We care about your health and have reviewed your health plan and are making recommendations based on this review, to optimize your health.    You are in particular need of attention regarding:  -High Blood Pressure  -Colon Cancer Screening  -Wellness (Physical) Visit   -Tobacco use    We are recommending that you:  -schedule a NURSE-ONLY BLOOD PRESSURE APPOINTMENT within the next 1-4 weeks.    -schedule a WELLNESS (Physical) APPOINTMENT with me.       -schedule a COLONOSCOPY to look for colon cancer (due every 10 years or 5 years in higher risk situations.)        Colon cancer is now the second leading cause of cancer-related deaths in the [place] for both men and women and there are over 130,000 new cases and 50,000 deaths per year from colon cancer.  Colonoscopies can prevent 90-95% of these deaths.  Problem lesions can be removed before they ever become cancer.  This test is not only looking for cancer, but also getting rid of precancerious lesions.    If you are under/uninsured, we recommend you contact the [employer] program. [employer] is a free colorectal cancer screening program that provides colonoscopies for eligible under/uninsured [place] men and women. If you are interested in receiving a free colonoscopy, please call [employer] at [phone] (mention code [employer]) to see if you re eligible.      If you do not wish to do a colonoscopy or cannot afford to do one, at this time, there is another option. It is called a FIT test or Fecal Immunochemical Occult Blood Test (take home stool sample kit).  It does not replace the colonoscopy for colorectal cancer screening, but it can detect hidden bleeding in the lower colon.  It does need to be repeated every year and if a positive result is obtained, you would  be referred for a colonoscopy.          If you have completed either one of these tests at another facility, please call with the details of when and where the tests were done and if they were normal or not. Or have the records sent to our clinic so that we can best coordinate your care.    Also, if you are smoking still and would like some help, then please contact us or make an appointment to discuss your options ([email or web address] has very good free information.)                                                            In addition, here is a list of due or overdue Health Maintenance reminders.    Health Maintenance Due   Topic Date Due     Discuss Advance Care Planning  Never done     Pneumococcal Vaccine (1 - PCV) Never done     Colorectal Cancer Screening  Never done     Preventive Care Visit  09/26/2019     COVID-19 Vaccine (3 - Booster for Pfizer series) 11/11/2021       To address the above recommendations, we encourage you to contact us at [phone] or via [employer]. They will assist you with finding the most convenient time and location.    Thank you for trusting [facility] and we appreciate the opportunity to serve you.  We look forward to supporting your healthcare needs in the future.    Healthy Regards,    Your [facility] Team

## 2022-08-31 ENCOUNTER — PATIENT OUTREACH (OUTPATIENT)
Dept: FAMILY MEDICINE | Facility: OTHER | Age: 27
End: 2022-08-31

## 2022-08-31 NOTE — TELEPHONE ENCOUNTER
Patient Quality Outreach    Patient is due for the following:   Depression  -  PHQ-9 needed    Next Steps:   Chart routed to abstraction.      Type of outreach:    Phone, left message for patient/parent to call back.      Questions for provider review:    None     Nallely Soria LPN  Chart routed to Care Team.

## 2022-11-17 ENCOUNTER — APPOINTMENT (OUTPATIENT)
Dept: GENERAL RADIOLOGY | Facility: HOSPITAL | Age: 27
End: 2022-11-17
Attending: EMERGENCY MEDICINE
Payer: COMMERCIAL

## 2022-11-17 ENCOUNTER — HOSPITAL ENCOUNTER (EMERGENCY)
Facility: HOSPITAL | Age: 27
Discharge: HOME | End: 2022-11-18
Attending: EMERGENCY MEDICINE | Admitting: EMERGENCY MEDICINE
Payer: COMMERCIAL

## 2022-11-17 ENCOUNTER — APPOINTMENT (OUTPATIENT)
Dept: ULTRASOUND IMAGING | Facility: HOSPITAL | Age: 27
End: 2022-11-17
Attending: EMERGENCY MEDICINE
Payer: COMMERCIAL

## 2022-11-17 DIAGNOSIS — R50.9 FEVER, UNSPECIFIED FEVER CAUSE: ICD-10-CM

## 2022-11-17 DIAGNOSIS — Z20.822 SUSPECTED 2019 NOVEL CORONAVIRUS INFECTION: ICD-10-CM

## 2022-11-17 DIAGNOSIS — M54.6 ACUTE MIDLINE THORACIC BACK PAIN: ICD-10-CM

## 2022-11-17 PROCEDURE — 87637 SARSCOV2&INF A&B&RSV AMP PRB: CPT | Performed by: EMERGENCY MEDICINE

## 2022-11-17 PROCEDURE — 99285 EMERGENCY DEPT VISIT HI MDM: CPT | Mod: 25,CS

## 2022-11-17 PROCEDURE — 93308 TTE F-UP OR LMTD: CPT | Mod: 26 | Performed by: EMERGENCY MEDICINE

## 2022-11-17 PROCEDURE — 93308 TTE F-UP OR LMTD: CPT | Mod: TC

## 2022-11-17 PROCEDURE — 71046 X-RAY EXAM CHEST 2 VIEWS: CPT

## 2022-11-17 PROCEDURE — 93010 ELECTROCARDIOGRAM REPORT: CPT | Performed by: INTERNAL MEDICINE

## 2022-11-17 PROCEDURE — C9803 HOPD COVID-19 SPEC COLLECT: HCPCS

## 2022-11-17 PROCEDURE — 99284 EMERGENCY DEPT VISIT MOD MDM: CPT | Mod: 25 | Performed by: EMERGENCY MEDICINE

## 2022-11-17 PROCEDURE — 93005 ELECTROCARDIOGRAM TRACING: CPT

## 2022-11-17 RX ORDER — ACETAMINOPHEN 325 MG/1
975 TABLET ORAL ONCE
Status: COMPLETED | OUTPATIENT
Start: 2022-11-18 | End: 2022-11-18

## 2022-11-17 RX ORDER — LOSARTAN POTASSIUM 100 MG/1
1 TABLET ORAL DAILY
COMMUNITY
Start: 2022-07-20 | End: 2022-11-25

## 2022-11-17 RX ORDER — IBUPROFEN 600 MG/1
600 TABLET, FILM COATED ORAL ONCE
Status: COMPLETED | OUTPATIENT
Start: 2022-11-18 | End: 2022-11-18

## 2022-11-17 RX ORDER — LIDOCAINE 4 G/G
1 PATCH TOPICAL ONCE
Status: DISCONTINUED | OUTPATIENT
Start: 2022-11-18 | End: 2022-11-18 | Stop reason: HOSPADM

## 2022-11-17 ASSESSMENT — ACTIVITIES OF DAILY LIVING (ADL): ADLS_ACUITY_SCORE: 35

## 2022-11-17 NOTE — Clinical Note
Earnest Richardson was seen and treated in our emergency department on 11/17/2022.  He may return to work on 11/21/2022.  Patient is awaiting covid test results. Please excuse pending negative result. If positive will need to stay home for 5 days and until fever free for 24 hours without use of medications, whichever is longer.      If you have any questions or concerns, please don't hesitate to call.      Zo Ca MD

## 2022-11-18 VITALS
RESPIRATION RATE: 16 BRPM | SYSTOLIC BLOOD PRESSURE: 117 MMHG | OXYGEN SATURATION: 96 % | WEIGHT: 241 LBS | BODY MASS INDEX: 35.59 KG/M2 | TEMPERATURE: 99.3 F | DIASTOLIC BLOOD PRESSURE: 74 MMHG | HEART RATE: 81 BPM

## 2022-11-18 LAB
FLUAV RNA SPEC QL NAA+PROBE: NEGATIVE
FLUBV RNA RESP QL NAA+PROBE: NEGATIVE
RSV RNA SPEC NAA+PROBE: NEGATIVE
SARS-COV-2 RNA RESP QL NAA+PROBE: NEGATIVE

## 2022-11-18 PROCEDURE — 250N000013 HC RX MED GY IP 250 OP 250 PS 637: Performed by: EMERGENCY MEDICINE

## 2022-11-18 RX ADMIN — LIDOCAINE 1 PATCH: 560 PATCH PERCUTANEOUS; TOPICAL; TRANSDERMAL at 00:03

## 2022-11-18 RX ADMIN — IBUPROFEN 600 MG: 600 TABLET ORAL at 00:03

## 2022-11-18 RX ADMIN — ACETAMINOPHEN 975 MG: 325 TABLET, FILM COATED ORAL at 00:02

## 2022-11-18 NOTE — ED PROVIDER NOTES
History     Chief Complaint   Patient presents with     Back Pain     HPI  Earnest Richardson is a 27 year old male who presents with thoracic back pain. Sudden onset mid thoracic back around 2 hours prior to arrival. Intermittent stabbing pain, episodes last 2-3 minutes and resolve. Did not take anything for pain. Has never had similar pain in the past. Reports had fever last night 100.9, chills. Also reports sore throat. No dyspnea, no chest pain. Reports nausea/vomiting. Had episode of tingling in arm which resolved. No numbness/weakness in legs, no leg swelling. No falls or injuries.  Recently finished metronidazole for trichomonas.      Was vaccinated for covid. Current smoker few cigarettes per day. Occasional marijuana.  Negative stress echo 2021    Allergies:  Allergies   Allergen Reactions     Lisinopril Cough     Penicillins Other (See Comments)     Childhood allergy       Problem List:    Patient Active Problem List    Diagnosis Date Noted     Suicidal ideation 03/25/2022     Priority: Medium     Neck mass 03/25/2022     Priority: Medium     Moderate episode of recurrent major depressive disorder (H) 03/25/2022     Priority: Medium     Brain fog 07/06/2021     Priority: Medium     Essential hypertension 04/19/2021     Priority: Medium     Morbid obesity (H) 12/14/2020     Priority: Medium     Chronic recurrent pilonidal cyst without abscess 06/26/2020     Priority: Medium     Added automatically from request for surgery [id number]       Other male erectile dysfunction 09/26/2018     Priority: Medium     NOAM (generalized anxiety disorder) 09/26/2018     Priority: Medium     Major depressive disorder, single episode, mild (H) 09/26/2018     Priority: Medium     Tobacco use disorder 09/26/2018     Priority: Medium     BMI (body mass index), pediatric, > 99% for age 01/17/2012     Priority: Medium     Oppositional defiant disorder 05/26/2010     Priority: Medium     Overview:   IMO Update 10/11       Dysthymic  disorder 04/20/2010     Priority: Medium     Attention deficit hyperactivity disorder (ADHD) 04/17/2008     Priority: Medium     Overview:   IMO Update 10 2016          Past Medical History:    Past Medical History:   Diagnosis Date     NOAM (generalized anxiety disorder) 9/26/2018     Major depressive disorder, single episode, mild (H) 9/26/2018       Past Surgical History:    Past Surgical History:   Procedure Laterality Date     CYSTECTOMY PILONIDAL N/A 7/8/2020    Procedure: EXCISION, PILONIDAL CYST;  Surgeon: Jackson Bernabe MD;  Location: [facility] OR     EXCISE PILONIDAL CYST, SIMPLE Right 07/08/2020     NO HISTORY OF SURGERY         Family History:    Family History   Problem Relation Age of Onset     Diabetes Mother      Hypertension Mother        Social History:  Marital Status:  Single [1]  Social History     Tobacco Use     Smoking status: Every Day     Packs/day: 0.50     Years: 6.00     Pack years: 3.00     Types: Cigarettes, Vaping Device     Start date: 2014     Smokeless tobacco: Never     Tobacco comments:     CBD   Vaping Use     Vaping Use: Never used   Substance Use Topics     Alcohol use: Yes     Comment: 4 a week     Drug use: Not Currently     Frequency: 2.0 times per week     Types: Other     Comment: CBD hemp flower or Gummie        Medications:    losartan (COZAAR) 100 MG tablet  losartan (COZAAR) 100 MG tablet          Review of Systems  Please seen HPI for pertinent positives and negatives. All other systems reviewed and found to be negative.   Physical Exam   BP: 151/100  Pulse: 102  Temp: 99.3  F (37.4  C)  Resp: 16  Weight: 109.3 kg (241 lb)  SpO2: 98 %      Physical Exam  Constitutional:       General: He is not in acute distress.     Appearance: He is not ill-appearing.   HENT:      Head: Normocephalic and atraumatic.      Nose: Nose normal.      Mouth/Throat:      Mouth: Mucous membranes are moist.      Pharynx: Oropharynx is clear.   Eyes:      Conjunctiva/sclera: Conjunctivae  normal.      Pupils: Pupils are equal, round, and reactive to light.   Cardiovascular:      Rate and Rhythm: Normal rate and regular rhythm.      Comments: +2 DP and radial pulses  Pulmonary:      Effort: Pulmonary effort is normal.      Breath sounds: Normal breath sounds.   Abdominal:      Palpations: Abdomen is soft.      Tenderness: There is no abdominal tenderness.   Musculoskeletal:      Cervical back: Normal range of motion.      Right lower leg: No edema.      Left lower leg: No edema.      Comments: No midline spine tenderness. No reproducible back tenderness   Lymphadenopathy:      Cervical: No cervical adenopathy.   Skin:     General: Skin is warm and dry.   Neurological:      Mental Status: He is alert and oriented to person, place, and time.      Comments: Moving all extremities symmetrically         ED Course                 Procedures    Results for orders placed during the hospital encounter of 11/17/22    POC US ECHO LIMITED    Impression  Limited Bedside Cardiac Ultrasound, performed and interpreted by me.  Indication: back pain.  Parasternal long axis, parasternal short axis, apical 4 chamber, subcostal and BL thoracic views were acquired.  Image quality was satisfactory.    Findings:  Global left ventricular function appears intact.  Chambers do not appear dilated.  There is no evidence of free fluid within the pericardium.  Aortic root measures <3 cm and there is no apparent regurgitation.    IMPRESSION: Grossly normal left ventricular function and chamber size.  No pericardial effusion..            EKG Interpretation:      Interpreted by Zo Ca MD  Time reviewed: 2136  Symptoms at time of EKG: back pain   Rhythm: normal sinus   Rate: normal  Axis: normal  Ectopy: none  Conduction: normal  ST Segments/ T Waves: No ST-T wave changes  Q Waves: none  Comparison to prior: Unchanged    Clinical Impression: normal EKG          Critical Care time:  none               Results for orders  placed or performed during the hospital encounter of 11/17/22 (from the past 24 hour(s))   POC US ECHO LIMITED    Impression    Limited Bedside Cardiac Ultrasound, performed and interpreted by me.   Indication: back pain.  Parasternal long axis, parasternal short axis, apical 4 chamber, subcostal and BL thoracic views were acquired.   Image quality was satisfactory.    Findings:    Global left ventricular function appears intact.  Chambers do not appear dilated.  There is no evidence of free fluid within the pericardium.  Aortic root measures <3 cm and there is no apparent regurgitation.     IMPRESSION: Grossly normal left ventricular function and chamber size.  No pericardial effusion..         Medications   Lidocaine (LIDOCARE) 4 % Patch 1 patch (1 patch Transdermal Patch/Med Applied 11/18/22 0003)   acetaminophen (TYLENOL) tablet 975 mg (975 mg Oral Given 11/18/22 0002)   ibuprofen (ADVIL/MOTRIN) tablet 600 mg (600 mg Oral Given 11/18/22 0003)       Assessments & Plan (with Medical Decision Making)     I have reviewed the nursing notes.    I have reviewed the findings, diagnosis, plan and need for follow up with the patient.   Mr Richardson is a 26 yo man who presents with thoracic back pain.  Episodic, sharp, stabbing pain.  Does not seem consistent with ACS.  EKG was normal sinus rhythm without ischemic changes.  Also had fever last night.  Has mild sore throat.  May represent infectious cause such as pneumonia or viral illness.  Obtain COVID test with RSV and flu.  Also obtain chest x-ray which was negative for pneumonia.  Bedside echo showed no evidence of dilated chambers, and no pericardial effusion.  Lung sliding was present bilaterally.  Aortic root was measured within normal limits.  Low suspicion for aortic emergency given intermittent nature of pain.  Overall no apparent life-threatening cause of symptoms at this time.  Recommended supportive care at home, self-isolation until COVID test results return.   Return precautions discussed as detailed in AVS. Patient expressed understanding.     New Prescriptions    No medications on file       Final diagnoses:   Acute midline thoracic back pain   Fever, unspecified fever cause   Suspected 2019 novel coronavirus infection       11/17/2022   [facility]     Zo Ca MD  11/18/22 0025

## 2022-11-18 NOTE — DISCHARGE INSTRUCTIONS
Make sure you are drinking plenty of water.  Most adults need 1 to 2 L/day.  Urine should be light/clear  Ibuprofen 600 mg every 6 hours as needed for pain with food and plenty of water. Discontinue use after 5 days.   Tylenol 500-1000 mg every 6 hours as needed for pain.  Do not take more than 4000 mg per day   Ice or heat (protect skin with towel) as needed for pain.  Remove lidocaine patch 12 hours after application (12 pm)  Do not apply heat over the patch  Wash hands after handling and dispose of out of reach of children  After your skin has been patch free for 12 hours you can apply a new patch.  These are available over-the-counter as 4% lidocaine patches, brand name Salonpas  Remove immediately if you develop tingling particularly of the face, palpitations, skin irritation, or other concerns.   See your primary doctor in 2-3 days.  Return to the emergency department for worsening pain, leg weakness/numbness, loss of bowel/bladder control, difficulty breathing, loss of conscousness, or if you have any new or changing symptoms/concerns

## 2022-11-18 NOTE — ED NOTES
Face to face report given with opportunity to observe patient.    Report given to WILLAM Bernard RN   11/17/2022  10:54 PM

## 2022-11-18 NOTE — ED TRIAGE NOTES
Patient presents with complaints of back pain that he states is about dead center. He states it is sharp and started about an hour and a half ago.

## 2022-11-18 NOTE — ED NOTES
Patient reports that around 1900 severe pain in between shoulder blades and left arm feels numb.  Patient reports that since pain isn't as sharp.

## 2022-11-25 ENCOUNTER — APPOINTMENT (OUTPATIENT)
Dept: CT IMAGING | Facility: HOSPITAL | Age: 27
End: 2022-11-25
Attending: PHYSICIAN ASSISTANT
Payer: COMMERCIAL

## 2022-11-25 ENCOUNTER — NURSE TRIAGE (OUTPATIENT)
Dept: NURSING | Facility: CLINIC | Age: 27
End: 2022-11-25

## 2022-11-25 ENCOUNTER — HOSPITAL ENCOUNTER (EMERGENCY)
Facility: HOSPITAL | Age: 27
Discharge: HOME | End: 2022-11-25
Attending: INTERNAL MEDICINE | Admitting: INTERNAL MEDICINE
Payer: COMMERCIAL

## 2022-11-25 VITALS
OXYGEN SATURATION: 98 % | TEMPERATURE: 99.2 F | DIASTOLIC BLOOD PRESSURE: 97 MMHG | SYSTOLIC BLOOD PRESSURE: 143 MMHG | RESPIRATION RATE: 19 BRPM | HEART RATE: 79 BPM

## 2022-11-25 DIAGNOSIS — H53.9 VISION CHANGES: ICD-10-CM

## 2022-11-25 LAB
ANION GAP SERPL CALCULATED.3IONS-SCNC: 9 MMOL/L (ref 7–15)
BASOPHILS # BLD AUTO: 0 10E3/UL (ref 0–0.2)
BASOPHILS NFR BLD AUTO: 0 %
BUN SERPL-MCNC: 8.1 MG/DL (ref 6–20)
CALCIUM SERPL-MCNC: 8.6 MG/DL (ref 8.6–10)
CHLORIDE SERPL-SCNC: 105 MMOL/L (ref 98–107)
CREAT SERPL-MCNC: 0.85 MG/DL (ref 0.67–1.17)
DEPRECATED HCO3 PLAS-SCNC: 24 MMOL/L (ref 22–29)
EOSINOPHIL # BLD AUTO: 0.2 10E3/UL (ref 0–0.7)
EOSINOPHIL NFR BLD AUTO: 3 %
ERYTHROCYTE [DISTWIDTH] IN BLOOD BY AUTOMATED COUNT: 11.9 % (ref 10–15)
GFR SERPL CREATININE-BSD FRML MDRD: >90 ML/MIN/1.73M2
GLUCOSE SERPL-MCNC: 98 MG/DL (ref 70–99)
HCT VFR BLD AUTO: 43.3 % (ref 40–53)
HGB BLD-MCNC: 15.2 G/DL (ref 13.3–17.7)
IMM GRANULOCYTES # BLD: 0 10E3/UL
IMM GRANULOCYTES NFR BLD: 0 %
LYMPHOCYTES # BLD AUTO: 2.3 10E3/UL (ref 0.8–5.3)
LYMPHOCYTES NFR BLD AUTO: 38 %
MCH RBC QN AUTO: 33.2 PG (ref 26.5–33)
MCHC RBC AUTO-ENTMCNC: 35.1 G/DL (ref 31.5–36.5)
MCV RBC AUTO: 95 FL (ref 78–100)
MONOCYTES # BLD AUTO: 0.6 10E3/UL (ref 0–1.3)
MONOCYTES NFR BLD AUTO: 10 %
NEUTROPHILS # BLD AUTO: 2.9 10E3/UL (ref 1.6–8.3)
NEUTROPHILS NFR BLD AUTO: 49 %
NRBC # BLD AUTO: 0 10E3/UL
NRBC BLD AUTO-RTO: 0 /100
PLATELET # BLD AUTO: 273 10E3/UL (ref 150–450)
POTASSIUM SERPL-SCNC: 4 MMOL/L (ref 3.4–5.3)
RBC # BLD AUTO: 4.58 10E6/UL (ref 4.4–5.9)
SODIUM SERPL-SCNC: 138 MMOL/L (ref 136–145)
WBC # BLD AUTO: 6.1 10E3/UL (ref 4–11)

## 2022-11-25 PROCEDURE — 99283 EMERGENCY DEPT VISIT LOW MDM: CPT | Performed by: PHYSICIAN ASSISTANT

## 2022-11-25 PROCEDURE — 250N000011 HC RX IP 250 OP 636: Performed by: PHYSICIAN ASSISTANT

## 2022-11-25 PROCEDURE — 99285 EMERGENCY DEPT VISIT HI MDM: CPT | Mod: 25 | Performed by: PHYSICIAN ASSISTANT

## 2022-11-25 PROCEDURE — 70496 CT ANGIOGRAPHY HEAD: CPT

## 2022-11-25 PROCEDURE — 85025 COMPLETE CBC W/AUTO DIFF WBC: CPT | Performed by: PHYSICIAN ASSISTANT

## 2022-11-25 PROCEDURE — 96375 TX/PRO/DX INJ NEW DRUG ADDON: CPT | Mod: XU | Performed by: PHYSICIAN ASSISTANT

## 2022-11-25 PROCEDURE — 82310 ASSAY OF CALCIUM: CPT | Performed by: PHYSICIAN ASSISTANT

## 2022-11-25 PROCEDURE — 96361 HYDRATE IV INFUSION ADD-ON: CPT | Performed by: PHYSICIAN ASSISTANT

## 2022-11-25 PROCEDURE — 36415 COLL VENOUS BLD VENIPUNCTURE: CPT | Performed by: PHYSICIAN ASSISTANT

## 2022-11-25 PROCEDURE — 258N000003 HC RX IP 258 OP 636: Performed by: PHYSICIAN ASSISTANT

## 2022-11-25 PROCEDURE — 96374 THER/PROPH/DIAG INJ IV PUSH: CPT | Performed by: PHYSICIAN ASSISTANT

## 2022-11-25 PROCEDURE — 250N000009 HC RX 250: Performed by: PHYSICIAN ASSISTANT

## 2022-11-25 PROCEDURE — 70450 CT HEAD/BRAIN W/O DYE: CPT

## 2022-11-25 RX ORDER — METOCLOPRAMIDE HYDROCHLORIDE 5 MG/ML
10 INJECTION INTRAMUSCULAR; INTRAVENOUS ONCE
Status: COMPLETED | OUTPATIENT
Start: 2022-11-25 | End: 2022-11-25

## 2022-11-25 RX ORDER — ARIPIPRAZOLE 5 MG/1
2.5 TABLET ORAL AT BEDTIME
COMMUNITY
Start: 2022-07-25

## 2022-11-25 RX ORDER — TETRACAINE HYDROCHLORIDE 5 MG/ML
1-2 SOLUTION OPHTHALMIC ONCE
Status: COMPLETED | OUTPATIENT
Start: 2022-11-25 | End: 2022-11-25

## 2022-11-25 RX ORDER — DIPHENHYDRAMINE HYDROCHLORIDE 50 MG/ML
25 INJECTION INTRAMUSCULAR; INTRAVENOUS ONCE
Status: COMPLETED | OUTPATIENT
Start: 2022-11-25 | End: 2022-11-25

## 2022-11-25 RX ORDER — IOPAMIDOL 755 MG/ML
50 INJECTION, SOLUTION INTRAVASCULAR ONCE
Status: COMPLETED | OUTPATIENT
Start: 2022-11-25 | End: 2022-11-25

## 2022-11-25 RX ADMIN — DIPHENHYDRAMINE HYDROCHLORIDE 25 MG: 50 INJECTION, SOLUTION INTRAMUSCULAR; INTRAVENOUS at 20:19

## 2022-11-25 RX ADMIN — SODIUM CHLORIDE 1000 ML: 9 INJECTION, SOLUTION INTRAVENOUS at 20:17

## 2022-11-25 RX ADMIN — TETRACAINE HYDROCHLORIDE 2 DROP: 5 SOLUTION OPHTHALMIC at 18:50

## 2022-11-25 RX ADMIN — FLUORESCEIN SODIUM 1 STRIP: 1 STRIP OPHTHALMIC at 18:55

## 2022-11-25 RX ADMIN — IOPAMIDOL 50 ML: 755 INJECTION, SOLUTION INTRAVENOUS at 19:40

## 2022-11-25 RX ADMIN — METOCLOPRAMIDE HYDROCHLORIDE 10 MG: 5 INJECTION INTRAMUSCULAR; INTRAVENOUS at 20:19

## 2022-11-25 ASSESSMENT — ACTIVITIES OF DAILY LIVING (ADL)
ADLS_ACUITY_SCORE: 33
ADLS_ACUITY_SCORE: 35

## 2022-11-25 NOTE — TELEPHONE ENCOUNTER
Patient is complaining of right eye shadowy aura, [place] on the outside and smaller one on the inside that is flashing around or some noted static within the right eye  Symptoms started two days ago and feels like a magnifying glass and distorted vision    Triage guidelines recommend to go to ED now (or pcp triage)     Caller verbalized and understands directives      Caller verbalized and understands directives      Reason for Disposition    [1] Blurred vision or visual changes AND [2] present now AND [3] sudden onset or new (e.g., minutes, hours, days)  (Exception: seeing floaters / black specks OR previously diagnosed migraine headaches with same symptoms)    Additional Information    Negative: Weakness of the face, arm or leg on one side of the body    Negative: Followed getting substance in the eye    Negative: Foreign body or object is or was lodged in the eye    Negative: Followed an eye injury    Negative: Followed sun lamp or sun exposure (UV keratitis)    Negative: Yellow or green discharge (pus) in the eye    Negative: Pregnant    Negative: Postpartum (from 0 to 6 weeks after delivery)    Negative: Complete loss of vision in 1 or both eyes    Negative: SEVERE eye pain    Negative: SEVERE headache    Negative: Double vision    Protocols used: VISION LOSS OR CHANGE-A-AH

## 2022-11-26 NOTE — ED PROVIDER NOTES
History     Chief Complaint   Patient presents with     Loss of Vision     HPI  Earnest Richardson is a 27 year old male who Zent[facility] to the ER for evaluation of right eye loss of vision.  Patient states approximately 5 days ago he lost left half of his right eye vision along with circumferential disruption of vision.  Patient notes his symptoms as like static on the TV in the peripheral circumferential area and on the left half of his right visual field it is like a dark shadow.  No left eye vision changes.  He notes a right periorbital headache.  He has felt slightly nauseated the last 2 days.  Patient denies any weakness no lightheadedness or dizziness denies any fevers or chills.  No cough congestion chest pain shortness of breath.  No difficulty with speech or swallowing.  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  He denies any photophobia.  No history of migraines.    Allergies:  Allergies   Allergen Reactions     Lisinopril Cough     Penicillins Other (See Comments)     Childhood allergy       Problem List:    Patient Active Problem List    Diagnosis Date Noted     Suicidal ideation 03/25/2022     Priority: Medium     Neck mass 03/25/2022     Priority: Medium     Moderate episode of recurrent major depressive disorder (H) 03/25/2022     Priority: Medium     Brain fog 07/06/2021     Priority: Medium     Essential hypertension 04/19/2021     Priority: Medium     Morbid obesity (H) 12/14/2020     Priority: Medium     Chronic recurrent pilonidal cyst without abscess 06/26/2020     Priority: Medium     Added automatically from request for surgery [id number]       Other male erectile dysfunction 09/26/2018     Priority: Medium     NOAM (generalized anxiety disorder) 09/26/2018     Priority: Medium     Major depressive disorder, single episode, mild (H) 09/26/2018     Priority: Medium     Tobacco use disorder 09/26/2018     Priority: Medium     BMI (body mass index),  pediatric, > 99% for age 01/17/2012     Priority: Medium     Oppositional defiant disorder 05/26/2010     Priority: Medium     Overview:   IMO Update 10/11       Dysthymic disorder 04/20/2010     Priority: Medium     Attention deficit hyperactivity disorder (ADHD) 04/17/2008     Priority: Medium     Overview:   IMO Update 10 2016          Past Medical History:    Past Medical History:   Diagnosis Date     NOAM (generalized anxiety disorder) 9/26/2018     Major depressive disorder, single episode, mild (H) 9/26/2018       Past Surgical History:    Past Surgical History:   Procedure Laterality Date     CYSTECTOMY PILONIDAL N/A 7/8/2020    Procedure: EXCISION, PILONIDAL CYST;  Surgeon: Jackson Bernabe MD;  Location: [facility] OR     EXCISE PILONIDAL CYST, SIMPLE Right 07/08/2020     NO HISTORY OF SURGERY         Family History:    Family History   Problem Relation Age of Onset     Diabetes Mother      Hypertension Mother        Social History:  Marital Status:  Single [1]  Social History     Tobacco Use     Smoking status: Every Day     Packs/day: 0.50     Years: 6.00     Pack years: 3.00     Types: Cigarettes, Vaping Device     Start date: 2014     Smokeless tobacco: Never     Tobacco comments:     CBD   Vaping Use     Vaping Use: Never used   Substance Use Topics     Alcohol use: Yes     Comment: 4 a week     Drug use: Not Currently     Frequency: 2.0 times per week     Types: Other     Comment: CBD hemp flower or Gummie        Medications:    losartan (COZAAR) 100 MG tablet  ARIPiprazole (ABILIFY) 5 MG tablet          Review of Systems   All other systems reviewed and are negative.      Physical Exam   BP: 130/84  Pulse: 89  Temp: 99.2  F (37.3  C)  Resp: 16  SpO2: 97 %      Physical Exam  Constitutional:       General: He is not in acute distress.     Appearance: Normal appearance. He is normal weight. He is not ill-appearing, toxic-appearing or diaphoretic.   HENT:      Head: Normocephalic and atraumatic.       Right Ear: External ear normal.      Left Ear: External ear normal.   Eyes:      Extraocular Movements: Extraocular movements intact.      Conjunctiva/sclera: Conjunctivae normal.      Pupils: Pupils are equal, round, and reactive to light.   Cardiovascular:      Rate and Rhythm: Normal rate.   Pulmonary:      Effort: Pulmonary effort is normal. No respiratory distress.   Musculoskeletal:         General: Normal range of motion.   Skin:     General: Skin is warm and dry.      Coloration: Skin is not jaundiced or pale.   Neurological:      Mental Status: He is alert and oriented to person, place, and time. Mental status is at baseline.      Cranial Nerves: No cranial nerve deficit.   Psychiatric:         Mood and Affect: Mood normal.         ED Course       I have reviewed the epic chart, the nurses note and triage note. vital signs were reviewed.  CBC BMP are normal.  Reglan IV fluids and Benadryl given with no change.  No fluorescein uptake pressures are normal in the eye.  CT of the head and CTA head and neck are obtained with no acute findings per radiologist.  I spoke with ophthalmologist Dr. Estela Davison from the [facility] who is on-call through [facility].  She would like to see the patient tomorrow morning at 1030 instructions given.  Discussed the findings with the patient.  I believe at this time no further work-up is indicated.  Symptoms at this point are most consistent with a retinal detachment being managed now with ophthalmology tomorrow morning.  If those are negative then I discussed the patient in pursuing an outpatient MRI as a next step given the duration of symptoms have exceeded an acute phase.         Procedures                  Results for orders placed or performed during the hospital encounter of 11/25/22 (from the past 24 hour(s))   CBC with platelets differential    Narrative    The following orders were created for panel order CBC with platelets differential.  Procedure                                Abnormality         Status                     ---------                               -----------         ------                     CBC with platelets and d...[[id number]]  Abnormal            Final result                 Please view results for these tests on the individual orders.   Basic metabolic panel   Result Value Ref Range    Sodium 138 136 - 145 mmol/L    Potassium 4.0 3.4 - 5.3 mmol/L    Chloride 105 98 - 107 mmol/L    Carbon Dioxide (CO2) 24 22 - 29 mmol/L    Anion Gap 9 7 - 15 mmol/L    Urea Nitrogen 8.1 6.0 - 20.0 mg/dL    Creatinine 0.85 0.67 - 1.17 mg/dL    Calcium 8.6 8.6 - 10.0 mg/dL    Glucose 98 70 - 99 mg/dL    GFR Estimate >90 >60 mL/min/1.73m2   CBC with platelets and differential   Result Value Ref Range    WBC Count 6.1 4.0 - 11.0 10e3/uL    RBC Count 4.58 4.40 - 5.90 10e6/uL    Hemoglobin 15.2 13.3 - 17.7 g/dL    Hematocrit 43.3 40.0 - 53.0 %    MCV 95 78 - 100 fL    MCH 33.2 (H) 26.5 - 33.0 pg    MCHC 35.1 31.5 - 36.5 g/dL    RDW 11.9 10.0 - 15.0 %    Platelet Count 273 150 - 450 10e3/uL    % Neutrophils 49 %    % Lymphocytes 38 %    % Monocytes 10 %    % Eosinophils 3 %    % Basophils 0 %    % Immature Granulocytes 0 %    NRBCs per 100 WBC 0 <1 /100    Absolute Neutrophils 2.9 1.6 - 8.3 10e3/uL    Absolute Lymphocytes 2.3 0.8 - 5.3 10e3/uL    Absolute Monocytes 0.6 0.0 - 1.3 10e3/uL    Absolute Eosinophils 0.2 0.0 - 0.7 10e3/uL    Absolute Basophils 0.0 0.0 - 0.2 10e3/uL    Absolute Immature Granulocytes 0.0 <=0.4 10e3/uL    Absolute NRBCs 0.0 10e3/uL   Head CT w/o contrast    Narrative    PROCEDURE: CT HEAD W/O CONTRAST   11/25/2022 7:41 PM    HISTORY:Male, age,  27 years, , , right eye vision changes    COMPARISON:No relevant prior imaging.    TECHNIQUE: CT of the brain without contrast. Axial; sagittal and  coronal MIP images were reviewed.    FINDINGS: Ventricles and sulci are normal in size and shape. Gray and  white matter demonstrate normal  differentiation.    There is no evidence of mass, mass effect or midline shift. No  evidence of acute hemorrhage.    The bones are unremarkable. No fracture.       Impression    IMPRESSION:   No acute intracranial abnormality.  No acute fracture. Unremarkable  examination.    This facility minimizes radiation dose by adjusting the mA and/or kV  according to each patient size.      This CT scan was performed using one or more the following dose  reduction techniques:    -Automated exposure control,  -Adjustment of the mA and/or kV according to patient's size, and/or,  -Use of iterative reconstruction technique.      RANULFO DEY MD         SYSTEM ID:  [id number]   CTA Head Neck with Contrast    Narrative    PROCEDURE: CTA HEAD NECK W CONTRAST   11/25/2022 7:48 PM    HISTORY:Male, age,  27 years, , , right eye vision changes    COMPARISON:No relevant prior imaging.    TECHNIQUE: CT scan was performed of the head and neck  before and  after the administration of intravenous contrast. Sagittal, coronal,  axial and 3-D reconstructed MIP  images were reviewed.    FINDINGS:   CTA chest: The visualized portions of the aortic arch are  unremarkable. Innominate artery, left and right subclavian arteries,  left and right common carotid arteries and the left and right  vertebral arteries appear to be patent and grossly normal. Carotid  bifurcations are unremarkable. There is no evidence of vertebral  artery dissection. Cervical portions of the left and right internal  carotid artery are unremarkable. The external carotid arteries and  branches are also unremarkable.    CTA brain: The vertebral basilar system is patent. Posterior see large  and branches are unremarkable. Petrous and cavernous portions of the  left and right internal carotid artery are unremarkable. The anterior  and middle cerebral arteries and respective branches are also  unremarkable.    Contrast-enhanced CT scan of the brain: The ventricles and sulci  are  normal in size and shape. The gray and white matter exhibit normal  differentiation. There is no evidence of abnormal enhancement. Globes  are symmetric. Paranasal sinuses demonstrate no acute abnormality.  There is slight deviation of the bony nasal septum to the right.    Contrast-enhanced CT scan of the neck: The muscles of mastication and  the salivary glands are unremarkable. There is no evidence of  pathologic lymph node enlargement. The oral and pharyngeal mucosa  appear grossly normal. Larynx is normal. Streak artifact limits  evaluation of the thyroid gland. Visualized portions of the lungs are  clear. Bony structures are unremarkable.       Impression    IMPRESSION:   Negative CTA of the head and neck. No evidence of acute vascular  abnormality. No evidence of occlusion, stenosis, dissection, aneurysm  or arteriovenous malformation.    This facility minimizes radiation dose by adjusting the mA and/or kV  according to each patient size.    This CT scan was performed using one or more the following dose  reduction techniques:    -Automated exposure control,  -Adjustment of the mA and/or kV according to patient's size, and/or,  -Use of iterative reconstruction technique.      RANULFO DEY MD         SYSTEM ID:  [id number]       Medications   0.9% sodium chloride BOLUS (1,000 mLs Intravenous New Bag 11/25/22 2017)   fluorescein (FUL-SUSANA) ophthalmic strip 1 strip (1 strip Right Eye Given 11/25/22 1855)   tetracaine (PONTOCAINE) 0.5 % ophthalmic solution 1-2 drop (2 drops Right Eye Given 11/25/22 1850)   sodium chloride (PF) 0.9% PF flush 100 mL (100 mLs Intravenous Given 11/25/22 1940)   iopamidol (ISOVUE-370) solution 50 mL (50 mLs Intravenous Given 11/25/22 1940)   metoclopramide (REGLAN) injection 10 mg (10 mg Intravenous Given 11/25/22 2019)   diphenhydrAMINE (BENADRYL) injection 25 mg (25 mg Intravenous Given 11/25/22 2019)       Assessments & Plan (with Medical Decision Making)     I have  reviewed the nursing notes.    I have reviewed the findings, diagnosis, plan and need for follow up with the patient.      New Prescriptions    No medications on file       Final diagnoses:   Vision changes       11/25/2022   [facility]     Farhan Reddy PA-C  11/25/22 2046

## 2022-11-26 NOTE — DISCHARGE INSTRUCTIONS
Follow-up with Dr. Estela Davison at the [facility] at 10:30 AM tomorrow Saturday morning.  The phone number there is [phone] the [facility] clinic is located at [facility] in [place].  Parked in a handicap spot when you pull up there she will wave you in.  The door will be locked but rest assured she will be there and when she calls you prior to inform you that she will be.  It be helpful to have a  as she will be dilating eyes.        What to expect when you have contrast    During your exam, we will inject  contrast  into your vein or artery. (Contrast is a clear liquid with iodine in it. It shows up on X-rays.)    You may feel warm or hot. You may have a metal taste in your mouth and a slight upset stomach. You may also feel pressure near the kidneys and bladder. These effects will last about 1 to 3 minutes.    Please tell us if you have:   Sneezing    Itching   Hives    Swelling in the face   A hoarse voice   Breathing problems   Other new symptoms    Serious problems are rare.  They may include:   Irregular heartbeat    Seizures   Kidney failure             Tissue damage   Shock     Death    If you have any problems during the exam, we  will treat them right away.    When you get home    Call your hospital if you have any new symptoms in the next 2 days, like hives or swelling. (Phone numbers are at the bottom of this page.) Or call your family doctor.     If you have wheezing or trouble breathing, call 911.    Self-care  -Drink at least 4 extra glasses of water today.   This reduces the stress on your kidneys.  -Keep taking your regular medicines.    The contrast will pass out of your body in your  Urine(pee). This will happen in the next 24 hours. You  will not feel this. Your urine will not  change color.    If you have kidney problems or take metformin    Drink 4 to 8 large glasses of water for the next  2 days, if you are not on a fluid restriction.    ?If you take  metformin (Glucophage or Glucovance) for diabetes, keep taking it.      ?Your kidney function tests are abnormal.  If you take Metformin, do not take it for 48 hours. Please go to your clinic for a blood test within 3 days after your exam before the restarting this medicine.     (Note to provider:please give patient prescription for lab tests.)    ?Special instructions:     I have read and understand the above information.    Patient Sign Here:______________________________________Date:________Time:______    Staff Sign Here:________________________________________Date:_______Time:______      Radiology Departments:     ?[facility]: [phone] ?[place]: [phone]     ?[place]: [phone] ?[facility]:[phone]      ?[place]: [phone]  ?[facility]: [phone]  ?[place]:[phone]    ?[facility]:[phone]  ?[facility]:[phone]

## 2022-11-26 NOTE — ED TRIAGE NOTES
Pt presents with c/o a circular loss of vision in his right eye for the past 4-5 days, Pt states intermittent headache and a little loss of balance, neuro eval called.

## 2023-02-16 ENCOUNTER — HOSPITAL ENCOUNTER (EMERGENCY)
Facility: HOSPITAL | Age: 28
Discharge: HOME | End: 2023-02-16
Attending: PHYSICIAN ASSISTANT | Admitting: PHYSICIAN ASSISTANT
Payer: COMMERCIAL

## 2023-02-16 VITALS
SYSTOLIC BLOOD PRESSURE: 126 MMHG | TEMPERATURE: 98 F | DIASTOLIC BLOOD PRESSURE: 75 MMHG | RESPIRATION RATE: 16 BRPM | BODY MASS INDEX: 34.18 KG/M2 | OXYGEN SATURATION: 99 % | HEART RATE: 69 BPM | WEIGHT: 231.48 LBS

## 2023-02-16 DIAGNOSIS — R10.9 RIGHT-SIDED ABDOMINAL PAIN OF UNKNOWN ETIOLOGY: ICD-10-CM

## 2023-02-16 LAB
ALBUMIN SERPL BCG-MCNC: 3.9 G/DL (ref 3.5–5.2)
ALBUMIN UR-MCNC: NEGATIVE MG/DL
ALP SERPL-CCNC: 58 U/L (ref 40–129)
ALT SERPL W P-5'-P-CCNC: 20 U/L (ref 10–50)
ANION GAP SERPL CALCULATED.3IONS-SCNC: 9 MMOL/L (ref 7–15)
APPEARANCE UR: CLEAR
AST SERPL W P-5'-P-CCNC: 16 U/L (ref 10–50)
BASOPHILS # BLD AUTO: 0 10E3/UL (ref 0–0.2)
BASOPHILS NFR BLD AUTO: 1 %
BILIRUB SERPL-MCNC: 0.3 MG/DL
BILIRUB UR QL STRIP: NEGATIVE
BUN SERPL-MCNC: 14.5 MG/DL (ref 6–20)
CALCIUM SERPL-MCNC: 8.5 MG/DL (ref 8.6–10)
CHLORIDE SERPL-SCNC: 103 MMOL/L (ref 98–107)
COLOR UR AUTO: ABNORMAL
CREAT SERPL-MCNC: 0.89 MG/DL (ref 0.67–1.17)
DEPRECATED HCO3 PLAS-SCNC: 25 MMOL/L (ref 22–29)
EOSINOPHIL # BLD AUTO: 0.1 10E3/UL (ref 0–0.7)
EOSINOPHIL NFR BLD AUTO: 2 %
ERYTHROCYTE [DISTWIDTH] IN BLOOD BY AUTOMATED COUNT: 12 % (ref 10–15)
GFR SERPL CREATININE-BSD FRML MDRD: >90 ML/MIN/1.73M2
GLUCOSE SERPL-MCNC: 115 MG/DL (ref 70–99)
GLUCOSE UR STRIP-MCNC: NEGATIVE MG/DL
HCT VFR BLD AUTO: 42.4 % (ref 40–53)
HGB BLD-MCNC: 15.2 G/DL (ref 13.3–17.7)
HGB UR QL STRIP: NEGATIVE
HOLD SPECIMEN: NORMAL
IMM GRANULOCYTES # BLD: 0 10E3/UL
IMM GRANULOCYTES NFR BLD: 0 %
KETONES UR STRIP-MCNC: NEGATIVE MG/DL
LEUKOCYTE ESTERASE UR QL STRIP: ABNORMAL
LIPASE SERPL-CCNC: 19 U/L (ref 13–60)
LYMPHOCYTES # BLD AUTO: 1.3 10E3/UL (ref 0.8–5.3)
LYMPHOCYTES NFR BLD AUTO: 19 %
MCH RBC QN AUTO: 33.2 PG (ref 26.5–33)
MCHC RBC AUTO-ENTMCNC: 35.8 G/DL (ref 31.5–36.5)
MCV RBC AUTO: 93 FL (ref 78–100)
MONOCYTES # BLD AUTO: 0.6 10E3/UL (ref 0–1.3)
MONOCYTES NFR BLD AUTO: 8 %
MUCOUS THREADS #/AREA URNS LPF: PRESENT /LPF
NEUTROPHILS # BLD AUTO: 4.7 10E3/UL (ref 1.6–8.3)
NEUTROPHILS NFR BLD AUTO: 70 %
NITRATE UR QL: NEGATIVE
NRBC # BLD AUTO: 0 10E3/UL
NRBC BLD AUTO-RTO: 0 /100
PH UR STRIP: 5.5 [PH] (ref 4.7–8)
PLATELET # BLD AUTO: 219 10E3/UL (ref 150–450)
POTASSIUM SERPL-SCNC: 4 MMOL/L (ref 3.4–5.3)
PROT SERPL-MCNC: 6.1 G/DL (ref 6.4–8.3)
RBC # BLD AUTO: 4.58 10E6/UL (ref 4.4–5.9)
RBC URINE: <1 /HPF
SODIUM SERPL-SCNC: 137 MMOL/L (ref 136–145)
SP GR UR STRIP: 1.02 (ref 1–1.03)
SQUAMOUS EPITHELIAL: 0 /HPF
UROBILINOGEN UR STRIP-MCNC: NORMAL MG/DL
WBC # BLD AUTO: 6.7 10E3/UL (ref 4–11)
WBC URINE: 4 /HPF

## 2023-02-16 PROCEDURE — 83690 ASSAY OF LIPASE: CPT | Performed by: PHYSICIAN ASSISTANT

## 2023-02-16 PROCEDURE — 80053 COMPREHEN METABOLIC PANEL: CPT | Performed by: PHYSICIAN ASSISTANT

## 2023-02-16 PROCEDURE — 99283 EMERGENCY DEPT VISIT LOW MDM: CPT

## 2023-02-16 PROCEDURE — 99284 EMERGENCY DEPT VISIT MOD MDM: CPT | Performed by: PHYSICIAN ASSISTANT

## 2023-02-16 PROCEDURE — 36415 COLL VENOUS BLD VENIPUNCTURE: CPT | Performed by: PHYSICIAN ASSISTANT

## 2023-02-16 PROCEDURE — 87086 URINE CULTURE/COLONY COUNT: CPT | Performed by: PHYSICIAN ASSISTANT

## 2023-02-16 PROCEDURE — 81001 URINALYSIS AUTO W/SCOPE: CPT | Performed by: PHYSICIAN ASSISTANT

## 2023-02-16 PROCEDURE — 85004 AUTOMATED DIFF WBC COUNT: CPT | Performed by: PHYSICIAN ASSISTANT

## 2023-02-16 RX ORDER — SUMATRIPTAN 100 MG/1
100 TABLET, FILM COATED ORAL 2 TIMES DAILY PRN
COMMUNITY
Start: 2022-09-30

## 2023-02-16 ASSESSMENT — ENCOUNTER SYMPTOMS
VOMITING: 0
APPETITE CHANGE: 0
DIFFICULTY URINATING: 0
ABDOMINAL PAIN: 1
FEVER: 0
DIARRHEA: 0
CHILLS: 0
NAUSEA: 1
CONSTIPATION: 0

## 2023-02-16 ASSESSMENT — ACTIVITIES OF DAILY LIVING (ADL): ADLS_ACUITY_SCORE: 35

## 2023-02-16 NOTE — ED NOTES
Discharge instructions reviewed. Verbalized understanding. Work excuse handed to patient with discharge paperwork. Ambulated out of ER independently.

## 2023-02-16 NOTE — LETTER
Earnest Richardson was seen and treated in our emergency department on 2/16/2023.  Please excuse him from work on 2/16 for a medical issue.     If you have any questions or concerns, please don't hesitate to call.                  Miguel Loving PA-C

## 2023-02-16 NOTE — ED PROVIDER NOTES
"  History     Chief Complaint   Patient presents with     Abdominal Pain     The history is provided by the patient and medical records.     Earnest Richardson is a 27 year old male who presents to the emergency department today complaining of right sided abdominal pain that came on today around 1100 this morning while he was at work. He reports he was yelling at a coworker and afterwards started to have severe right sided abdominal pain and nausea. His wife \"made him throw up\", which he thought would help the pain, but it did not. He states it was not because he had to throw up. He has not had this pain before. Denies history of abdominal surgeries, gall bladder issues, kidney stones. He is currently feeling much better, pain has resolved mostly. He did not take anything for the pain.   PMH includes: essential hypertension, morbid obesity, tobacco use    Allergies:  Allergies   Allergen Reactions     Lisinopril Cough     Bupropion Other (See Comments)     Throat hurt \"like getting a cold\"-had taken for many years, felt when restarted after a break.     Penicillins Other (See Comments)     Childhood allergy       Problem List:    Patient Active Problem List    Diagnosis Date Noted     Suicidal ideation 03/25/2022     Priority: Medium     Neck mass 03/25/2022     Priority: Medium     Moderate episode of recurrent major depressive disorder (H) 03/25/2022     Priority: Medium     Brain fog 07/06/2021     Priority: Medium     Essential hypertension 04/19/2021     Priority: Medium     Morbid obesity (H) 12/14/2020     Priority: Medium     Chronic recurrent pilonidal cyst without abscess 06/26/2020     Priority: Medium     Added automatically from request for surgery [id number]       Other male erectile dysfunction 09/26/2018     Priority: Medium     NOAM (generalized anxiety disorder) 09/26/2018     Priority: Medium     Major depressive disorder, single episode, mild (H) 09/26/2018     Priority: Medium     Tobacco use disorder " 09/26/2018     Priority: Medium     BMI (body mass index), pediatric, > 99% for age 01/17/2012     Priority: Medium     Oppositional defiant disorder 05/26/2010     Priority: Medium     Overview:   IMO Update 10/11       Dysthymic disorder 04/20/2010     Priority: Medium     Attention deficit hyperactivity disorder (ADHD) 04/17/2008     Priority: Medium     Overview:   IMO Update 10 2016          Past Medical History:    Past Medical History:   Diagnosis Date     NOAM (generalized anxiety disorder) 9/26/2018     Major depressive disorder, single episode, mild (H) 9/26/2018       Past Surgical History:    Past Surgical History:   Procedure Laterality Date     CYSTECTOMY PILONIDAL N/A 7/8/2020    Procedure: EXCISION, PILONIDAL CYST;  Surgeon: Jackson Bernabe MD;  Location: [facility] OR     EXCISE PILONIDAL CYST, SIMPLE Right 07/08/2020     NO HISTORY OF SURGERY         Family History:    Family History   Problem Relation Age of Onset     Diabetes Mother      Hypertension Mother        Social History:  Marital Status:  Single [1]  Social History     Tobacco Use     Smoking status: Every Day     Packs/day: 0.50     Years: 6.00     Pack years: 3.00     Types: Cigarettes, Vaping Device     Start date: 2014     Smokeless tobacco: Never     Tobacco comments:     CBD   Vaping Use     Vaping Use: Never used   Substance Use Topics     Alcohol use: Yes     Comment: 4 a week     Drug use: Not Currently     Frequency: 2.0 times per week     Types: Other     Comment: CBD hemp flower or Gummie        Medications:    ARIPiprazole (ABILIFY) 5 MG tablet  losartan (COZAAR) 100 MG tablet  medical cannabis (Patient's own supply)  SUMAtriptan (IMITREX) 100 MG tablet          Review of Systems   Constitutional: Negative for appetite change, chills and fever.   Gastrointestinal: Positive for abdominal pain and nausea. Negative for constipation, diarrhea and vomiting.        See HPI regarding vomiting   Genitourinary: Negative for difficulty  urinating.       Physical Exam   BP: 115/77  Pulse: 76  Temp: 98  F (36.7  C)  Resp: 16  Weight: 105 kg (231 lb 7.7 oz)  SpO2: 98 %      Physical Exam  Vitals and nursing note reviewed.   Constitutional:       General: He is not in acute distress.     Appearance: He is not ill-appearing.   HENT:      Mouth/Throat:      Mouth: Mucous membranes are moist.   Cardiovascular:      Pulses: Normal pulses.      Heart sounds: Normal heart sounds.   Pulmonary:      Effort: Pulmonary effort is normal.   Abdominal:      General: Bowel sounds are normal.      Palpations: Abdomen is soft.      Tenderness: There is no abdominal tenderness. There is no right CVA tenderness, left CVA tenderness, guarding or rebound.   Skin:     General: Skin is warm.      Capillary Refill: Capillary refill takes less than 2 seconds.   Neurological:      General: No focal deficit present.      Mental Status: He is alert.         ED Course                       Critical Care time:  none           Results for orders placed or performed during the hospital encounter of 02/16/23 (from the past 24 hour(s))   CBC with platelets differential    Narrative    The following orders were created for panel order CBC with platelets differential.  Procedure                               Abnormality         Status                     ---------                               -----------         ------                     CBC with platelets and d...[[id number]]  Abnormal            Final result                 Please view results for these tests on the individual orders.   Comprehensive metabolic panel   Result Value Ref [place]    Sodium 137 136 - 145 mmol/L    Potassium 4.0 3.4 - 5.3 mmol/L    Chloride 103 98 - 107 mmol/L    Carbon Dioxide (CO2) 25 22 - 29 mmol/L    Anion Gap 9 7 - 15 mmol/L    Urea Nitrogen 14.5 6.0 - 20.0 mg/dL    Creatinine 0.89 0.67 - 1.17 mg/dL    Calcium 8.5 (L) 8.6 - 10.0 mg/dL    Glucose 115 (H) 70 - 99 mg/dL    Alkaline Phosphatase 58 40 - 129  U/L    AST 16 10 - 50 U/L    ALT 20 10 - 50 U/L    Protein Total 6.1 (L) 6.4 - 8.3 g/dL    Albumin 3.9 3.5 - 5.2 g/dL    Bilirubin Total 0.3 <=1.2 mg/dL    GFR Estimate >90 >60 mL/min/1.73m2   Lipase   Result Value Ref [place]    Lipase 19 13 - 60 U/L   CBC with platelets and differential   Result Value Ref [place]    WBC Count 6.7 4.0 - 11.0 10e3/uL    RBC Count 4.58 4.40 - 5.90 10e6/uL    Hemoglobin 15.2 13.3 - 17.7 g/dL    Hematocrit 42.4 40.0 - 53.0 %    MCV 93 78 - 100 fL    MCH 33.2 (H) 26.5 - 33.0 pg    MCHC 35.8 31.5 - 36.5 g/dL    RDW 12.0 10.0 - 15.0 %    Platelet Count 219 150 - 450 10e3/uL    % Neutrophils 70 %    % Lymphocytes 19 %    % Monocytes 8 %    % Eosinophils 2 %    % Basophils 1 %    % Immature Granulocytes 0 %    NRBCs per 100 WBC 0 <1 /100    Absolute Neutrophils 4.7 1.6 - 8.3 10e3/uL    Absolute Lymphocytes 1.3 0.8 - 5.3 10e3/uL    Absolute Monocytes 0.6 0.0 - 1.3 10e3/uL    Absolute Eosinophils 0.1 0.0 - 0.7 10e3/uL    Absolute Basophils 0.0 0.0 - 0.2 10e3/uL    Absolute Immature Granulocytes 0.0 <=0.4 10e3/uL    Absolute NRBCs 0.0 10e3/uL   Extra Tube    Narrative    The following orders were created for panel order Extra Tube.  Procedure                               Abnormality         Status                     ---------                               -----------         ------                     Extra Blue Top Tube[[id number]]                              In process                 Extra Red Top Tube[[id number]]                               In process                 Extra Green Top (Lithium...[[id number]]                      In process                   Please view results for these tests on the individual orders.   UA with Microscopic reflex to Culture    Specimen: Urine, Clean Catch   Result Value Ref [place]    Color Urine Light Yellow Colorless, Straw, Light Yellow, Yellow    Appearance Urine Clear Clear    Glucose Urine Negative Negative mg/dL    Bilirubin Urine Negative Negative     "Ketones Urine Negative Negative mg/dL    Specific Gravity Urine 1.023 1.003 - 1.035    Blood Urine Negative Negative    pH Urine 5.5 4.7 - 8.0    Protein Albumin Urine Negative Negative mg/dL    Urobilinogen Urine Normal Normal, 2.0 mg/dL    Nitrite Urine Negative Negative    Leukocyte Esterase Urine Small (A) Negative    Mucus Urine Present (A) None Seen /LPF    RBC Urine <1 <=2 /HPF    WBC Urine 4 <=5 /HPF    Squamous Epithelials Urine 0 <=1 /HPF    Narrative    Urine Culture not indicated       Medications - No data to display    Assessments & Plan (with Medical Decision Making)  Earnest Richardson is a 27 year old male who presents to the emergency department today complaining of right sided abdominal pain that came on today around 1100 this morning while he was at work. He reports he was yelling at a coworker and afterwards started to have severe right sided abdominal pain and nausea. His wife \"made him throw up\", which he thought would help the pain, but it did not. He states it was not because he had to throw up. He has not had this pain before. Denies history of abdominal surgeries, gall bladder issues, kidney stones. He is currently feeling much better, pain has resolved mostly. He did not take anything for the pain.   PMH includes: essential hypertension, morbid obesity, tobacco use  Temp: 98  F (36.7  C) Temp src: Tympanic BP: 126/75 Pulse: 69   Resp: 16 SpO2: 99 %       Patient denied needing anything for pain. Discussed giving the improvement of his symptoms to do basic lab work and a urine to start. Patient is in agreement with this.   Labs: CBC:unmarkable  CMP: unremarkable Lipase: 19 Urinalysis: negative for UTI.   Given his benign physical exam and unremarkable lab work, patient will be discharged today home in stable condition, follow-up as needed for worsening symptoms. I do not think he has an acute abdomen at this time.       This patient was seen in conjunction with Alexandra Phelps NP-S.  However the " patient was interviewed and examined independently by myself.  This is a 27-year-old male who presented to the emergency department for approximate 90-minute history of right-sided abdominal pain after yelling at someone at work.  Pain is virtually resolved on arrival.  Abdominal exam is benign.  He has no focal tenderness or other concerns.  Looks well.  Feels well.  Laboratory evaluation as above.  At this time I can find no reasonable or compelling medical indication for further evaluation on an emergent basis.  He does not appear to be suffering from an intra-abdominal catastrophe.  Patient voiced complete understanding and was in complete agreement.  Close clinic follow-up.  Return here for any new or worsening symptoms.  Please see discharge instructions.  Differential diagnosis is broad and includes but is not limited to acute cholecystitis, acute appendicitis, small bowel obstruction, aortic dissection, musculoskeletal source, ureteral stone.    This document was prepared using a combination of typing and voice generated software.  While every attempt was made for accuracy, spelling and grammatical errors may exist.    I have reviewed the nursing notes.    I have reviewed the findings, diagnosis, plan and need for follow up with the patient.      Medical Decision Making  The patient's presentation is strongly suggestive of an acute and uncomplicated illness or injury.    The patient's evaluation involved:  an assessment requiring an independent historian (see separate area of note for details)  ordering and/or review of 3+ test(s) in this encounter (see separate area of note for details)    The patient's management involved only low risk treatment.        New Prescriptions    No medications on file       Final diagnoses:   Right-sided abdominal pain of unknown etiology       2/16/2023   [facility]     Miguel Loving PA-C  02/16/23 [id number]

## 2023-02-16 NOTE — ED TRIAGE NOTES
28 y/o male presents with right sided abdominal pain and nausea since 1100. He denies vomiting. He denies diarrhea or constipation. No prior abdominal surgeries.

## 2023-02-16 NOTE — DISCHARGE INSTRUCTIONS
"As we discussed, your labs today were unrevealing for an etiology of your symptoms.  This should not be construed as \"nothing is wrong.\"  It simply means that at this time we do not believe there is an indication for imaging or admission.  Monitor your symptoms.  Ibuprofen Tylenol for pain.  Advance diet as tolerated.  Return to this emergency department for any worsening or new symptoms.  "

## 2023-02-18 LAB — BACTERIA UR CULT: NO GROWTH

## 2023-04-13 ENCOUNTER — OFFICE VISIT (OUTPATIENT)
Dept: CHIROPRACTIC MEDICINE | Facility: OTHER | Age: 28
End: 2023-04-13
Attending: CHIROPRACTOR
Payer: COMMERCIAL

## 2023-04-13 DIAGNOSIS — M54.50 ACUTE BILATERAL LOW BACK PAIN WITHOUT SCIATICA: ICD-10-CM

## 2023-04-13 DIAGNOSIS — M99.02 SEGMENTAL AND SOMATIC DYSFUNCTION OF THORACIC REGION: ICD-10-CM

## 2023-04-13 DIAGNOSIS — M99.01 SEGMENTAL AND SOMATIC DYSFUNCTION OF CERVICAL REGION: ICD-10-CM

## 2023-04-13 DIAGNOSIS — M99.03 SEGMENTAL AND SOMATIC DYSFUNCTION OF LUMBAR REGION: Primary | ICD-10-CM

## 2023-04-13 PROCEDURE — 98941 CHIROPRACT MANJ 3-4 REGIONS: CPT | Mod: AT | Performed by: CHIROPRACTOR

## 2023-04-13 PROCEDURE — 99202 OFFICE O/P NEW SF 15 MIN: CPT | Mod: 25 | Performed by: CHIROPRACTOR

## 2023-04-17 NOTE — PROGRESS NOTES
Subjective Finding:    Chief compalint: Patient presents with:  Back Pain: Low back and neck pain  Ogoing for several weeks now    , Pain Scale: 4/10, Intensity: sharp, Duration: 2 weeks, Radiating: no.    Date of injury:     Activities that the pain restricts:   Home/household/hobbies/social activities: Yes.  Work duties: No.  Sleep: No.  Makes symptoms better: rest.  Makes symptoms worse: activity, lumbar extension and lumbar flexion.  Have you seen anyone else for the symptoms? No.  Work related: No.  Automobile related injury: No.    Objective and Assessment:    Posture Analysis:   High shoulder: .  Head tilt: .  High iliac crest: .  Head carriage: forward.  Thoracic Kyphosis: neutral.  Lumbar Lordosis: neutral.    Lumbar Range of Motion: extension decreased.  Cervical Range of Motion: extension decreased.  Thoracic Range of Motion: extension decreased.  Extremity Range of Motion: .    Palpation:   Quad lumb: bilateral, referred pain: no  Sub-occiput: sharp pain, referred pain: no    Segmental dysfunction pre-treatment and treatment area: C1, T4, T6 and L5.    Assessment post-treatment:  Cervical: ROM increased.  Thoracic: ROM increased.  Lumbar: ROM increased.    Comments: .      Complicating Factors: .    Procedure(s):  CMT:  62775 Chiropractic manipulative treatment 3-4 regions performed   Cervical: Diversified, See above for level, Supine, Thoracic: Diversified, See above for level, Prone and Lumbar: Diversified, See above for level, Side posture    Modalities:  None performed this visit    Therapeutic procedures:  None    Plan:  Treatment plan: 2 times per week for 2 weeks.  Instructed patient: stretch as instructed at visit and walk 10 minutes.  Short term goals: increase ROM.  Long term goals: restore normal function.  Prognosis: excellent.

## 2023-06-22 ENCOUNTER — APPOINTMENT (OUTPATIENT)
Dept: OCCUPATIONAL MEDICINE | Facility: OTHER | Age: 28
End: 2023-06-22

## 2023-06-22 PROCEDURE — 99000 SPECIMEN HANDLING OFFICE-LAB: CPT

## 2023-06-27 ENCOUNTER — APPOINTMENT (OUTPATIENT)
Dept: OCCUPATIONAL MEDICINE | Facility: OTHER | Age: 28
End: 2023-06-27

## 2023-06-27 PROCEDURE — 97799 UNLISTED PHYSCL MED/REHAB PX: CPT

## 2023-06-27 PROCEDURE — 99499 UNLISTED E&M SERVICE: CPT

## 2023-12-18 ENCOUNTER — APPOINTMENT (OUTPATIENT)
Dept: OCCUPATIONAL MEDICINE | Facility: OTHER | Age: 28
End: 2023-12-18

## 2023-12-18 PROCEDURE — 99080 SPECIAL REPORTS OR FORMS: CPT

## 2023-12-18 PROCEDURE — 99199 UNLISTED SPECIAL SVC PX/RPRT: CPT

## 2024-12-22 ENCOUNTER — HOSPITAL ENCOUNTER (EMERGENCY)
Facility: HOSPITAL | Age: 29
Discharge: HOME | End: 2024-12-22
Attending: NURSE PRACTITIONER | Admitting: NURSE PRACTITIONER
Payer: COMMERCIAL

## 2024-12-22 VITALS
RESPIRATION RATE: 16 BRPM | OXYGEN SATURATION: 98 % | DIASTOLIC BLOOD PRESSURE: 78 MMHG | SYSTOLIC BLOOD PRESSURE: 168 MMHG | TEMPERATURE: 99.2 F | HEART RATE: 97 BPM

## 2024-12-22 DIAGNOSIS — J02.0 ACUTE STREPTOCOCCAL PHARYNGITIS: Primary | ICD-10-CM

## 2024-12-22 LAB — S PYO DNA THROAT QL NAA+PROBE: DETECTED

## 2024-12-22 PROCEDURE — 99213 OFFICE O/P EST LOW 20 MIN: CPT | Performed by: NURSE PRACTITIONER

## 2024-12-22 PROCEDURE — 87651 STREP A DNA AMP PROBE: CPT | Performed by: NURSE PRACTITIONER

## 2024-12-22 PROCEDURE — G0463 HOSPITAL OUTPT CLINIC VISIT: HCPCS

## 2024-12-22 RX ORDER — AZITHROMYCIN 250 MG/1
500 TABLET, FILM COATED ORAL DAILY
Qty: 10 TABLET | Refills: 0 | Status: SHIPPED | OUTPATIENT
Start: 2024-12-22 | End: 2024-12-27

## 2024-12-22 ASSESSMENT — COLUMBIA-SUICIDE SEVERITY RATING SCALE - C-SSRS
1. IN THE PAST MONTH, HAVE YOU WISHED YOU WERE DEAD OR WISHED YOU COULD GO TO SLEEP AND NOT WAKE UP?: NO
6. HAVE YOU EVER DONE ANYTHING, STARTED TO DO ANYTHING, OR PREPARED TO DO ANYTHING TO END YOUR LIFE?: NO
2. HAVE YOU ACTUALLY HAD ANY THOUGHTS OF KILLING YOURSELF IN THE PAST MONTH?: NO

## 2024-12-22 ASSESSMENT — ENCOUNTER SYMPTOMS
VOICE CHANGE: 0
TROUBLE SWALLOWING: 0
FEVER: 0
SORE THROAT: 1

## 2024-12-22 NOTE — DISCHARGE INSTRUCTIONS
You have strep throat which should be treated with antibiotic prescribed today.    Tylenol or ibuprofen as needed for pain and drink plenty of fluids.    Follow-up with your primary doctor as needed.  Return to urgent care or emergency department for any worsening or concerning symptoms.

## 2024-12-22 NOTE — ED PROVIDER NOTES
"  History     Chief Complaint   Patient presents with    Pharyngitis     HPI  Earnest Richardson is a 29 year old male who presents to urgent care for evaluation of a sore throat.  Patient tells me he tested positive for COVID-19 infection 4 days ago.  Over the last 3 days he has had a sore throat and feels like he is \"swallowing rocks\".  He has not had a fever.  The patient is unsure if this is part of the COVID infection or if this is something different concerning the sore throat started after he was diagnosed with COVID-19.    Allergies:  Allergies   Allergen Reactions    Lisinopril Cough    Bupropion Other (See Comments)     Throat hurt \"like getting a cold\"-had taken for many years, felt when restarted after a break.    Penicillins Other (See Comments)     Childhood allergy       Problem List:    Patient Active Problem List    Diagnosis Date Noted    Suicidal ideation 03/25/2022     Priority: Medium    Neck mass 03/25/2022     Priority: Medium    Moderate episode of recurrent major depressive disorder (H) 03/25/2022     Priority: Medium    Brain fog 07/06/2021     Priority: Medium    Essential hypertension 04/19/2021     Priority: Medium    Morbid obesity (H) 12/14/2020     Priority: Medium    Chronic recurrent pilonidal cyst without abscess 06/26/2020     Priority: Medium     Added automatically from request for surgery [id number]      Other male erectile dysfunction 09/26/2018     Priority: Medium    NOAM (generalized anxiety disorder) 09/26/2018     Priority: Medium    Major depressive disorder, single episode, mild (H) 09/26/2018     Priority: Medium    Tobacco use disorder 09/26/2018     Priority: Medium    Body mass index (BMI) pediatric, 95th percentile for age to less than 120% of the 95th percentile for age 01/17/2012     Priority: Medium    Oppositional defiant disorder 05/26/2010     Priority: Medium     Overview:   IMO Update 10/11      Dysthymic disorder 04/20/2010     Priority: Medium    Attention " deficit hyperactivity disorder (ADHD) 04/17/2008     Priority: Medium     Overview:   IMO Update 10 2016          Past Medical History:    Past Medical History:   Diagnosis Date    NOAM (generalized anxiety disorder) 9/26/2018    Major depressive disorder, single episode, mild (H) 9/26/2018       Past Surgical History:    Past Surgical History:   Procedure Laterality Date    CYSTECTOMY PILONIDAL N/A 7/8/2020    Procedure: EXCISION, PILONIDAL CYST;  Surgeon: Jackson Bernabe MD;  Location: [facility] OR    EXCISE PILONIDAL CYST, SIMPLE Right 07/08/2020    NO HISTORY OF SURGERY         Family History:    Family History   Problem Relation Age of Onset    Diabetes Mother     Hypertension Mother        Social History:  Marital Status:  Single [1]  Social History     Tobacco Use    Smoking status: Every Day     Current packs/day: 0.50     Average packs/day: 0.5 packs/day for 11.0 years (5.5 ttl pk-yrs)     Types: Cigarettes, Vaping Device     Start date: 2014    Smokeless tobacco: Never    Tobacco comments:     CBD   Vaping Use    Vaping status: Never Used   Substance Use Topics    Alcohol use: Yes     Comment: 4 a week    Drug use: Not Currently     Frequency: 2.0 times per week     Types: Other     Comment: CBD hemp flower or Gummie        Medications:    ARIPiprazole (ABILIFY) 5 MG tablet  azithromycin (ZITHROMAX) 250 MG tablet  losartan (COZAAR) 100 MG tablet  medical cannabis (Patient's own supply)  SUMAtriptan (IMITREX) 100 MG tablet          Review of Systems   Constitutional:  Negative for fever.   HENT:  Positive for sore throat. Negative for trouble swallowing and voice change.    All other systems reviewed and are negative.      Physical Exam   BP: 168/78  Pulse: 97  Temp: 99.2  F (37.3  C)  Resp: 16  SpO2: 98 %      Physical Exam  Vitals and nursing note reviewed.   Constitutional:       General: He is not in acute distress.     Appearance: He is well-developed. He is not diaphoretic.   HENT:      Head:  Normocephalic and atraumatic.      Right Ear: Tympanic membrane and ear canal normal.      Left Ear: Ear canal normal.      Mouth/Throat:      Mouth: Mucous membranes are moist.      Pharynx: Oropharynx is clear. Uvula midline. Posterior oropharyngeal erythema present.      Tonsils: No tonsillar exudate or tonsillar abscesses. 1+ on the right. 1+ on the left.   Eyes:      Pupils: Pupils are equal, round, and reactive to light.   Cardiovascular:      Rate and Rhythm: Normal rate and regular rhythm.      Heart sounds: Normal heart sounds.   Pulmonary:      Effort: Pulmonary effort is normal. No respiratory distress.      Breath sounds: Normal breath sounds. No rhonchi.   Musculoskeletal:      Cervical back: Normal range of motion and neck supple.   Skin:     General: Skin is warm and dry.      Coloration: Skin is not pale.   Neurological:      Mental Status: He is alert and oriented to person, place, and time.         ED Course        Procedures       Results for orders placed or performed during the hospital encounter of 12/22/24 (from the past 24 hours)   Group A Streptococcus PCR Throat Swab    Specimen: Throat; Swab   Result Value Ref Range    Group A strep by PCR Detected (A) Not Detected    Narrative    The Xpert Xpress Strep A test, performed on the [employer], is a rapid, qualitative in vitro diagnostic test for the detection of Streptococcus pyogenes (Group A ß-hemolytic Streptococcus, Strep A) in throat swab specimens from patients with signs and symptoms of pharyngitis. The Xpert Xpress Strep A test can be used as an aid in the diagnosis of Group A Streptococcal pharyngitis. The assay is not intended to monitor treatment for Group A Streptococcus infections. The Xpert Xpress Strep A test utilizes an automated real-time polymerase chain reaction (PCR) to detect Streptococcus pyogenes DNA.       Medications - No data to display    Assessments & Plan (with Medical Decision Making)    29-year-old male with a sore throat x 3 days.  Uvula was midline.  Erythema to posterior pharynx with no significant exudate appreciated.  No trismus.  Patient tested positive for strep throat which will be treated with azithromycin.  History of penicillin allergy and unsure what his reaction was.  Encouraged him to drink plenty of fluids, take Tylenol or ibuprofen as needed for pain and do warm salt water gargles.  Follow-up with primary doctor as needed.  Return to urgent care or emergency department for any worsening or concerning symptoms.    I have reviewed the nursing notes.    I have reviewed the findings, diagnosis, plan and need for follow up with the patient.  This document was prepared using a combination of typing and voice generated software.  While every attempt was made for accuracy, spelling and grammatical errors may exist.         New Prescriptions    AZITHROMYCIN (ZITHROMAX) 250 MG TABLET    Take 2 tablets (500 mg) by mouth daily for 5 days. For strep throat       Final diagnoses:   Acute streptococcal pharyngitis       12/22/2024   [facility]       Mpofu, Prudence, CNP  12/22/24 [id number]

## 2025-05-17 ENCOUNTER — HEALTH MAINTENANCE LETTER (OUTPATIENT)
Age: 30
End: 2025-05-17

## 2025-06-26 ENCOUNTER — HOSPITAL ENCOUNTER (EMERGENCY)
Facility: HOSPITAL | Age: 30
Discharge: HOME | End: 2025-06-26
Attending: PHYSICIAN ASSISTANT
Payer: OTHER MISCELLANEOUS

## 2025-06-26 VITALS
DIASTOLIC BLOOD PRESSURE: 89 MMHG | HEART RATE: 81 BPM | OXYGEN SATURATION: 96 % | SYSTOLIC BLOOD PRESSURE: 120 MMHG | TEMPERATURE: 98.8 F | RESPIRATION RATE: 18 BRPM

## 2025-06-26 DIAGNOSIS — S39.92XA LOWER BACK INJURY, INITIAL ENCOUNTER: ICD-10-CM

## 2025-06-26 PROCEDURE — G0463 HOSPITAL OUTPT CLINIC VISIT: HCPCS | Mod: 25 | Performed by: PHYSICIAN ASSISTANT

## 2025-06-26 PROCEDURE — 250N000011 HC RX IP 250 OP 636: Performed by: PHYSICIAN ASSISTANT

## 2025-06-26 PROCEDURE — 96372 THER/PROPH/DIAG INJ SC/IM: CPT | Performed by: PHYSICIAN ASSISTANT

## 2025-06-26 PROCEDURE — 99213 OFFICE O/P EST LOW 20 MIN: CPT | Performed by: PHYSICIAN ASSISTANT

## 2025-06-26 RX ORDER — KETOROLAC TROMETHAMINE 30 MG/ML
60 INJECTION, SOLUTION INTRAMUSCULAR; INTRAVENOUS ONCE
Status: COMPLETED | OUTPATIENT
Start: 2025-06-26 | End: 2025-06-26

## 2025-06-26 RX ORDER — HYDROCODONE BITARTRATE AND ACETAMINOPHEN 5; 325 MG/1; MG/1
1 TABLET ORAL EVERY 8 HOURS PRN
Qty: 10 TABLET | Refills: 0 | Status: SHIPPED | OUTPATIENT
Start: 2025-06-26 | End: 2025-06-29

## 2025-06-26 RX ADMIN — KETOROLAC TROMETHAMINE 60 MG: 30 INJECTION, SOLUTION INTRAMUSCULAR at 12:02

## 2025-06-26 ASSESSMENT — COLUMBIA-SUICIDE SEVERITY RATING SCALE - C-SSRS
2. HAVE YOU ACTUALLY HAD ANY THOUGHTS OF KILLING YOURSELF IN THE PAST MONTH?: NO
6. HAVE YOU EVER DONE ANYTHING, STARTED TO DO ANYTHING, OR PREPARED TO DO ANYTHING TO END YOUR LIFE?: NO
1. IN THE PAST MONTH, HAVE YOU WISHED YOU WERE DEAD OR WISHED YOU COULD GO TO SLEEP AND NOT WAKE UP?: NO

## 2025-06-26 ASSESSMENT — ENCOUNTER SYMPTOMS: BACK PAIN: 1

## 2025-06-26 ASSESSMENT — ACTIVITIES OF DAILY LIVING (ADL)
ADLS_ACUITY_SCORE: 41
ADLS_ACUITY_SCORE: 41

## 2025-06-26 NOTE — DISCHARGE INSTRUCTIONS
You can take Tylenol or ibuprofen over-the-counter as directed for pain; however, do not take any NSAIDs for the remainder of today due to receiving Toradol injection.  You may take Tylenol but do not exceed 4000 mg in a 24-hour period.  You are prescribed Norco for severe breakthrough pain.  Rotate ice packs and heating pad as discussed.  Follow-up with occupational medicine.  Any emergent symptoms as discussed return to emergency department immediately.

## 2025-06-26 NOTE — ED PROVIDER NOTES
"  History     Chief Complaint   Patient presents with    Back Pain     HPI  Earnest Richardson is a 30 year old male who presents to urgent care for evaluation of low back pain.  Patient is a  and states that yesterday while at work throughout the workday bending and picking things up he noticed some tightness and decreased mobility in his low back.  He states then when he went home he started to experience pain in his lower back and that today now the pain is radiating down both butt cheeks.  Patient denies any urinary retention, incontinence of bowel or bladder, saddle anesthesia, peripheral weakness or numbness.  Denies any previous back injuries or surgeries.    Allergies:  Allergies   Allergen Reactions    Lisinopril Cough    Bupropion Other (See Comments)     Throat hurt \"like getting a cold\"-had taken for many years, felt when restarted after a break.    Penicillins Other (See Comments)     Childhood allergy       Problem List:    Patient Active Problem List    Diagnosis Date Noted    Suicidal ideation 03/25/2022     Priority: Medium    Neck mass 03/25/2022     Priority: Medium    Moderate episode of recurrent major depressive disorder (H) 03/25/2022     Priority: Medium    Brain fog 07/06/2021     Priority: Medium    Essential hypertension 04/19/2021     Priority: Medium    Morbid obesity (H) 12/14/2020     Priority: Medium    Chronic recurrent pilonidal cyst without abscess 06/26/2020     Priority: Medium     Added automatically from request for surgery [id number]      Other male erectile dysfunction 09/26/2018     Priority: Medium    NOAM (generalized anxiety disorder) 09/26/2018     Priority: Medium    Major depressive disorder, single episode, mild 09/26/2018     Priority: Medium    Tobacco use disorder 09/26/2018     Priority: Medium    Body mass index (BMI) pediatric, 95th percentile for age to less than 120% of the 95th percentile for age 01/17/2012     Priority: Medium    Oppositional defiant disorder " 05/26/2010     Priority: Medium     Overview:   IMO Update 10/11      Dysthymic disorder 04/20/2010     Priority: Medium    Attention deficit hyperactivity disorder (ADHD) 04/17/2008     Priority: Medium     Overview:   IMO Update 10 2016          Past Medical History:    Past Medical History:   Diagnosis Date    NOAM (generalized anxiety disorder) 9/26/2018    Major depressive disorder, single episode, mild 9/26/2018       Past Surgical History:    Past Surgical History:   Procedure Laterality Date    CYSTECTOMY PILONIDAL N/A 7/8/2020    Procedure: EXCISION, PILONIDAL CYST;  Surgeon: Jackson Bernabe MD;  Location: [facility] OR    EXCISE PILONIDAL CYST, SIMPLE Right 07/08/2020    NO HISTORY OF SURGERY         Family History:    Family History   Problem Relation Age of Onset    Diabetes Mother     Hypertension Mother        Social History:  Marital Status:  Single [1]  Social History     Tobacco Use    Smoking status: Every Day     Current packs/day: 0.50     Average packs/day: 0.5 packs/day for 11.5 years (5.7 ttl pk-yrs)     Types: Cigarettes, Vaping Device     Start date: 2014    Smokeless tobacco: Never    Tobacco comments:     CBD   Vaping Use    Vaping status: Never Used   Substance Use Topics    Alcohol use: Yes     Comment: 4 a week    Drug use: Not Currently     Frequency: 2.0 times per week     Types: Other     Comment: CBD hemp flower or Gummie        Medications:    ARIPiprazole (ABILIFY) 5 MG tablet  losartan (COZAAR) 100 MG tablet  medical cannabis (Patient's own supply)  SUMAtriptan (IMITREX) 100 MG tablet          Review of Systems   Musculoskeletal:  Positive for back pain.   All other systems reviewed and are negative.      Physical Exam   BP: 120/89  Pulse: 81  Temp: 98.8  F (37.1  C)  Resp: 18  SpO2: 96 %      Physical Exam  Vitals and nursing note reviewed.   Constitutional:       General: He is not in acute distress.     Appearance: Normal appearance. He is not ill-appearing or toxic-appearing.       Comments: Patient sitting in chair leaning to 1 side and is visibly uncomfortable.   Musculoskeletal:        Back:       Comments: Patient has tenderness with palpation over lumbar spine.  Pain is radiating to right and left lower lumbar area as well as down into glutes.   Neurological:      Mental Status: He is alert.         ED Course        Procedures             Critical Care time:               No results found for this or any previous visit (from the past 24 hours).    Medications   ketorolac (TORADOL) injection 60 mg (has no administration in time range)       Assessments & Plan (with Medical Decision Making)   #1.  Low back injury, initial encounter    Discussed exam findings with patient.  Patient declines x-ray at this time because he is unsure if work comp will cover it and he might have to pay out-of-pocket.  Patient given Toradol IM injection in urgent care today.  Patient is discharged with short prescription for Norco for severe breakthrough pain.  Patient is encouraged to rotate ice and heating pad long with rest.  Patient given x 1 week off of work to help facilitate healing.  Patient will follow-up with occupational medicine to be cleared for work.  Patient is encouraged to take Tylenol and/or ibuprofen as directed for pain control.  Strict emergent return precautions discussed with patient.  Additional concerns patient can return to urgent care/emergency department or follow-up with primary care provider.  Patient verbalized understanding and agreement with plan.      I have reviewed the nursing notes.    I have reviewed the findings, diagnosis, plan and need for follow up with the patient.              New Prescriptions    No medications on file       Final diagnoses:   Lower back injury, initial encounter       6/26/2025   [facility]       Shaun Coronel PA-C  06/26/25 0915

## 2025-06-26 NOTE — Clinical Note
Earnest Richardson was seen and treated in our emergency department on 6/26/2025.  He may return to work on 07/03/2025.  Please excuse patient from work due to medical reasons.     If you have any questions or concerns, please don't hesitate to call.      Shaun Coronel PA-C

## 2025-07-01 ENCOUNTER — OFFICE VISIT (OUTPATIENT)
Dept: FAMILY MEDICINE | Facility: OTHER | Age: 30
End: 2025-07-01
Attending: CHIROPRACTOR
Payer: OTHER MISCELLANEOUS

## 2025-07-01 VITALS
SYSTOLIC BLOOD PRESSURE: 141 MMHG | OXYGEN SATURATION: 96 % | WEIGHT: 301.4 LBS | HEART RATE: 87 BPM | BODY MASS INDEX: 44.51 KG/M2 | RESPIRATION RATE: 20 BRPM | DIASTOLIC BLOOD PRESSURE: 88 MMHG | TEMPERATURE: 99.3 F

## 2025-07-01 DIAGNOSIS — M54.42 ACUTE MIDLINE LOW BACK PAIN WITH LEFT-SIDED SCIATICA: ICD-10-CM
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
## 2025-07-01 ASSESSMENT — PAIN SCALES - GENERAL: PAINLEVEL_OUTOF10: MILD PAIN (1)

## 2025-07-01 NOTE — PROGRESS NOTES
"CHIEF COMPLAINT:   Chief Complaint   Patient presents with    Work Comp       HISTORY OF PRESENTING WORK INJURY     This is a follow up for acute low back pain occurring at work on 6/25/2025 while twisting.  He was seen the following day on 6/26/2025.  Note reviewed, no imaging performed.  Since then, with rest and medication he is improved and pain is now 1/10.  Occasional pain down left leg to foot, but mostly stiff.  Still no bladder/bowel changes or pelvic numbness.  He is here with his girlfriend.  He wants to return to full work duties.     PAST MEDICAL HISTORY:  Past Medical History:   Diagnosis Date    NOAM (generalized anxiety disorder) 9/26/2018    Major depressive disorder, single episode, mild 9/26/2018       PAST SURGICAL HISTORY:  Past Surgical History:   Procedure Laterality Date    CYSTECTOMY PILONIDAL N/A 7/8/2020    Procedure: EXCISION, PILONIDAL CYST;  Surgeon: Jackson Bernabe MD;  Location: [facility] OR    EXCISE PILONIDAL CYST, SIMPLE Right 07/08/2020    NO HISTORY OF SURGERY         ALLERGIES:  Allergies   Allergen Reactions    Lisinopril Cough    Bupropion Other (See Comments)     Throat hurt \"like getting a cold\"-had taken for many years, felt when restarted after a break.    Penicillins Other (See Comments)     Childhood allergy       CURRENT MEDICATIONS:  Current Outpatient Medications   Medication Sig Dispense Refill    losartan (COZAAR) 100 MG tablet Take 1 tablet (100 mg) by mouth daily 90 tablet 3    ARIPiprazole (ABILIFY) 5 MG tablet Take 2.5 mg by mouth At Bedtime (Patient not taking: Reported on 7/1/2025)      medical cannabis (Patient's own supply) 1 Dose See Admin Instructions (The purpose of this order is to document that the patient reports taking medical cannabis.  This is not a prescription, and is not used to certify that the patient has a qualifying medical condition.) (Patient not taking: Reported on 7/1/2025)      SUMAtriptan (IMITREX) 100 MG tablet Take 100 mg by mouth 2 " times daily as needed (Patient not taking: Reported on 7/1/2025)         SOCIAL HISTORY:  Social History     Socioeconomic History    Marital status: Single     Spouse name: Not on file    Number of children: Not on file    Years of education: Not on file    Highest education level: Not on file   Occupational History    Not on file   Tobacco Use    Smoking status: Former     Current packs/day: 0.50     Average packs/day: 0.5 packs/day for 11.5 years (5.7 ttl pk-yrs)     Types: Cigarettes, Vaping Device     Start date: 2014     Passive exposure: Past    Smokeless tobacco: Former    Tobacco comments:     CBD   Vaping Use    Vaping status: Every Day    Substances: Nicotine, CBD    Devices: Refillable tank, Pre-filled pod    Passive vaping exposure: Yes   Substance and Sexual Activity    Alcohol use: Yes     Comment: 4 a week    Drug use: Not Currently     Frequency: 2.0 times per week     Types: Other     Comment: CBD hemp flower or Gummie    Sexual activity: Yes     Partners: Female   Other Topics Concern    Parent/sibling w/ CABG, MI or angioplasty before 65F 55M? Not Asked   Social History Narrative    Not on file     Social Drivers of Health     Financial Resource Strain: Medium Risk (11/30/2022)    Received from [facility]    Overall Financial Resource Strain (CARDIA)     Difficulty of Paying Living Expenses: Somewhat hard   Food Insecurity: Food Insecurity Present (11/30/2022)    Received from [facility]    Hunger Vital Sign     Worried About Running Out of Food in the Last Year: Sometimes true     Ran Out of Food in the Last Year: Sometimes true   Transportation Needs: No Transportation Needs (11/30/2022)    [facility]    PRAPARE - Transportation     Lack of Transportation (Medical): No     Lack of Transportation (Non-Medical): No   Physical Activity: Insufficiently Active (11/30/2022)     [facility] [employer] [facility]    Exercise Vital Sign     Days of Exercise per Week: 2 days     Minutes of Exercise per Session: 30 min   Stress: Stress Concern Present (11/30/2022)    Received from [facility] [employer] [facility]    [place] [employer] - Occupational Stress Questionnaire     Feeling of Stress : To some extent   Social Connections: Socially Isolated (11/30/2022)    Received from [facility] [employer] [facility]    Social Connection and Isolation Panel [NHANES]     Frequency of Communication with Friends and Family: Once a week     Frequency of Social Gatherings with Friends and Family: Never     Attends [employer] Services: Never     Active Member of Clubs or Organizations: No     Attends Club or Organization Meetings: Never     Marital Status: Living with partner   Interpersonal Safety: Not At Risk (11/30/2022)    Received from [facility] [employer] [facility]    Humiliation, Afraid, Rape, and Kick questionnaire     Fear of Current or Ex-Partner: No     Emotionally Abused: No     Physically Abused: No     Sexually Abused: No   Housing Stability: Not on file       FAMILY HISTORY:  Family History   Problem Relation Age of Onset    Diabetes Mother     Hypertension Mother        REVIEW OF SYSTEMS:    Unremarkable       PHYSICAL EXAM:   BP (!) 141/88 (BP Location: Left arm, Patient Position: Sitting, Cuff Size: Adult Large)   Pulse 87   Temp 99.3  F (37.4  C) (Tympanic)   Resp 20   Wt (!) 136.7 kg (301 lb 6.4 oz)   SpO2 96%   BMI 44.51 kg/m   Body mass index is 44.51 kg/m .    General Appearance: No acute distress. Patient is ambulatory without assistance. Transitions without difficulty.  Stance and gait normal, no antalgia but observation finds high right hip. Full AROM of lumbar spine with some pain noted at low back.  Supine SLR negative to 50 degrees bilaterally.  Reflexes are full and sensory intact.   Prone assessment finds mild spasm of lumbar spine.      IMPRESSION/PLAN:    Capable of returning to full duties given description of normal job tasks, but he understands to notify us of worsening or job duties that produce aggravation.  He is also authorized to follow up with his chiropractor up to 3 sessions.  Return if needed as he is released without restrictions today.  MRI would be appropriate if symptoms continue.  Signs/symptoms to monitor given.  Two copies of workability issued.    Total time spent today in chart review/preparation, face to face evaluation, and documentation: 27 minutes.        Vadim Marc DC, DABFP  Director - Occupational Medicine Department  Diplomate of the [employer]    1:22 PM 7/1/2025

## 2025-07-03 ENCOUNTER — HOSPITAL ENCOUNTER (EMERGENCY)
Facility: HOSPITAL | Age: 30
Discharge: HOME | End: 2025-07-03
Attending: INTERNAL MEDICINE

## 2025-07-03 VITALS
DIASTOLIC BLOOD PRESSURE: 88 MMHG | HEIGHT: 70 IN | OXYGEN SATURATION: 96 % | TEMPERATURE: 98.3 F | HEART RATE: 86 BPM | BODY MASS INDEX: 43.51 KG/M2 | RESPIRATION RATE: 18 BRPM | SYSTOLIC BLOOD PRESSURE: 135 MMHG | WEIGHT: 303.9 LBS

## 2025-07-03 DIAGNOSIS — R42 DIZZINESS: ICD-10-CM

## 2025-07-03 LAB
ANION GAP SERPL CALCULATED.3IONS-SCNC: 14 MMOL/L (ref 7–15)
ATRIAL RATE - MUSE: 94 BPM
BASOPHILS # BLD AUTO: 0 10E3/UL (ref 0–0.2)
BASOPHILS NFR BLD AUTO: 1 %
BUN SERPL-MCNC: 15.1 MG/DL (ref 6–20)
CALCIUM SERPL-MCNC: 8.8 MG/DL (ref 8.8–10.4)
CHLORIDE SERPL-SCNC: 105 MMOL/L (ref 98–107)
CREAT SERPL-MCNC: 0.93 MG/DL (ref 0.67–1.17)
DIASTOLIC BLOOD PRESSURE - MUSE: NORMAL MMHG
EGFRCR SERPLBLD CKD-EPI 2021: >90 ML/MIN/1.73M2
EOSINOPHIL # BLD AUTO: 0.2 10E3/UL (ref 0–0.7)
EOSINOPHIL NFR BLD AUTO: 3 %
ERYTHROCYTE [DISTWIDTH] IN BLOOD BY AUTOMATED COUNT: 12.3 % (ref 10–15)
GLUCOSE SERPL-MCNC: 96 MG/DL (ref 70–99)
HCO3 SERPL-SCNC: 21 MMOL/L (ref 22–29)
HCT VFR BLD AUTO: 40 % (ref 40–53)
HGB BLD-MCNC: 14.1 G/DL (ref 13.3–17.7)
HOLD SPECIMEN: NORMAL
IMM GRANULOCYTES # BLD: 0 10E3/UL
IMM GRANULOCYTES NFR BLD: 0 %
INTERPRETATION ECG - MUSE: NORMAL
LYMPHOCYTES # BLD AUTO: 2.1 10E3/UL (ref 0.8–5.3)
LYMPHOCYTES NFR BLD AUTO: 28 %
MCH RBC QN AUTO: 33.5 PG (ref 26.5–33)
MCHC RBC AUTO-ENTMCNC: 35.3 G/DL (ref 31.5–36.5)
MCV RBC AUTO: 95 FL (ref 78–100)
MONOCYTES # BLD AUTO: 0.8 10E3/UL (ref 0–1.3)
MONOCYTES NFR BLD AUTO: 10 %
NEUTROPHILS # BLD AUTO: 4.2 10E3/UL (ref 1.6–8.3)
NEUTROPHILS NFR BLD AUTO: 58 %
NRBC # BLD AUTO: 0 10E3/UL
NRBC BLD AUTO-RTO: 0 /100
P AXIS - MUSE: 26 DEGREES
PLATELET # BLD AUTO: 248 10E3/UL (ref 150–450)
POTASSIUM SERPL-SCNC: 3.7 MMOL/L (ref 3.4–5.3)
PR INTERVAL - MUSE: 202 MS
QRS DURATION - MUSE: 98 MS
QT - MUSE: 350 MS
QTC - MUSE: 437 MS
R AXIS - MUSE: 54 DEGREES
RBC # BLD AUTO: 4.21 10E6/UL (ref 4.4–5.9)
SODIUM SERPL-SCNC: 140 MMOL/L (ref 135–145)
SYSTOLIC BLOOD PRESSURE - MUSE: NORMAL MMHG
T AXIS - MUSE: 14 DEGREES
TROPONIN T SERPL HS-MCNC: <6 NG/L
VENTRICULAR RATE- MUSE: 94 BPM
WBC # BLD AUTO: 7.3 10E3/UL (ref 4–11)

## 2025-07-03 PROCEDURE — 85025 COMPLETE CBC W/AUTO DIFF WBC: CPT | Performed by: NURSE PRACTITIONER

## 2025-07-03 PROCEDURE — 85025 COMPLETE CBC W/AUTO DIFF WBC: CPT | Performed by: INTERNAL MEDICINE

## 2025-07-03 PROCEDURE — 99284 EMERGENCY DEPT VISIT MOD MDM: CPT | Performed by: INTERNAL MEDICINE

## 2025-07-03 PROCEDURE — 93010 ELECTROCARDIOGRAM REPORT: CPT | Performed by: INTERNAL MEDICINE

## 2025-07-03 PROCEDURE — 85004 AUTOMATED DIFF WBC COUNT: CPT | Performed by: NURSE PRACTITIONER

## 2025-07-03 PROCEDURE — 99284 EMERGENCY DEPT VISIT MOD MDM: CPT

## 2025-07-03 PROCEDURE — 84484 ASSAY OF TROPONIN QUANT: CPT | Performed by: INTERNAL MEDICINE

## 2025-07-03 PROCEDURE — 36415 COLL VENOUS BLD VENIPUNCTURE: CPT | Performed by: NURSE PRACTITIONER

## 2025-07-03 PROCEDURE — 80048 BASIC METABOLIC PNL TOTAL CA: CPT | Performed by: INTERNAL MEDICINE

## 2025-07-03 PROCEDURE — 93005 ELECTROCARDIOGRAM TRACING: CPT

## 2025-07-03 ASSESSMENT — COLUMBIA-SUICIDE SEVERITY RATING SCALE - C-SSRS
6. HAVE YOU EVER DONE ANYTHING, STARTED TO DO ANYTHING, OR PREPARED TO DO ANYTHING TO END YOUR LIFE?: NO
2. HAVE YOU ACTUALLY HAD ANY THOUGHTS OF KILLING YOURSELF IN THE PAST MONTH?: NO
1. IN THE PAST MONTH, HAVE YOU WISHED YOU WERE DEAD OR WISHED YOU COULD GO TO SLEEP AND NOT WAKE UP?: NO

## 2025-07-04 LAB
ATRIAL RATE - MUSE: 94 BPM
DIASTOLIC BLOOD PRESSURE - MUSE: NORMAL MMHG
INTERPRETATION ECG - MUSE: NORMAL
P AXIS - MUSE: 26 DEGREES
PR INTERVAL - MUSE: 202 MS
QRS DURATION - MUSE: 98 MS
QT - MUSE: 350 MS
QTC - MUSE: 437 MS
R AXIS - MUSE: 54 DEGREES
SYSTOLIC BLOOD PRESSURE - MUSE: NORMAL MMHG
T AXIS - MUSE: 14 DEGREES
VENTRICULAR RATE- MUSE: 94 BPM

## 2025-07-04 NOTE — ED NOTES
Patient reports hypertension, but no other cardiac history. Patient states he takes 100 mg losartan daily, but isn't consistent on when he takes it during the day. Patient states a similar episode has happened before, but nothing this intense. Currently denies chest pain and dizziness. Patient states he took his daily losartan prior to coming to the ED and it made the symptoms go away. Patient reports possible dehydration and vaping.

## 2025-07-04 NOTE — ED TRIAGE NOTES
Patient states that yesterday he had some pain in his left side of his chest and just did not feel well. Today he felt dizzy like he was spinning. States that it came and went throughout the day. States that he felt like his heart was pounding and felt like he was going to pass out. This was about 1 hour ago.      Triage Assessment (Adult)       Row Name 07/03/25 1944          Triage Assessment    Airway WDL WDL        Respiratory WDL    Respiratory WDL WDL        Skin Circulation/Temperature WDL    Skin Circulation/Temperature WDL WDL        Cardiac WDL    Cardiac WDL WDL        Peripheral/Neurovascular WDL    Peripheral Neurovascular WDL WDL        Cognitive/Neuro/Behavioral WDL    Cognitive/Neuro/Behavioral WDL WDL

## 2025-07-06 ASSESSMENT — ENCOUNTER SYMPTOMS
SLEEP DISTURBANCE: 0
DIZZINESS: 1
FREQUENCY: 0
CHEST TIGHTNESS: 0
DIAPHORESIS: 0
NECK PAIN: 0
DYSURIA: 0
WEAKNESS: 0
NUMBNESS: 0
MYALGIAS: 0
HEADACHES: 0
CONFUSION: 0
BACK PAIN: 0
ABDOMINAL PAIN: 0
FEVER: 0
LIGHT-HEADEDNESS: 0
ABDOMINAL DISTENTION: 0
ANAL BLEEDING: 0
VOICE CHANGE: 0
BLOOD IN STOOL: 0
FLANK PAIN: 0
PALPITATIONS: 1
NAUSEA: 0
CHILLS: 0
VOMITING: 0
COLOR CHANGE: 0
COUGH: 0
SHORTNESS OF BREATH: 0
WHEEZING: 0

## 2025-07-06 NOTE — ED PROVIDER NOTES
"  History     Chief Complaint   Patient presents with    Dizziness     The history is provided by the patient.   Chest Pain  Pain location:  L lateral chest  Pain quality: sharp    Pain radiates to:  Does not radiate  Pain severity:  Mild  Timing:  Sporadic  Progression:  Resolved  Chronicity:  New  Associated symptoms: dizziness and palpitations    Associated symptoms: no abdominal pain, no back pain, no cough, no diaphoresis, no fever, no headache, no nausea, no numbness, no shortness of breath, no vomiting and no weakness          Allergies:  Allergies   Allergen Reactions    Lisinopril Cough    Bupropion Other (See Comments)     Throat hurt \"like getting a cold\"-had taken for many years, felt when restarted after a break.    Penicillins Other (See Comments)     Childhood allergy       Problem List:    Patient Active Problem List    Diagnosis Date Noted    Suicidal ideation 03/25/2022     Priority: Medium    Neck mass 03/25/2022     Priority: Medium    Moderate episode of recurrent major depressive disorder (H) 03/25/2022     Priority: Medium    Brain fog 07/06/2021     Priority: Medium    Essential hypertension 04/19/2021     Priority: Medium    Morbid obesity (H) 12/14/2020     Priority: Medium    Chronic recurrent pilonidal cyst without abscess 06/26/2020     Priority: Medium     Added automatically from request for surgery [id number]      Other male erectile dysfunction 09/26/2018     Priority: Medium    NOAM (generalized anxiety disorder) 09/26/2018     Priority: Medium    Major depressive disorder, single episode, mild 09/26/2018     Priority: Medium    Tobacco use disorder 09/26/2018     Priority: Medium    Body mass index (BMI) pediatric, 95th percentile for age to less than 120% of the 95th percentile for age 01/17/2012     Priority: Medium    Oppositional defiant disorder 05/26/2010     Priority: Medium     Overview:   IMO Update 10/11      Dysthymic disorder 04/20/2010     Priority: Medium    Attention " deficit hyperactivity disorder (ADHD) 04/17/2008     Priority: Medium     Overview:   IMO Update 10 2016          Past Medical History:    Past Medical History:   Diagnosis Date    NOAM (generalized anxiety disorder) 9/26/2018    Major depressive disorder, single episode, mild 9/26/2018       Past Surgical History:    Past Surgical History:   Procedure Laterality Date    CYSTECTOMY PILONIDAL N/A 7/8/2020    Procedure: EXCISION, PILONIDAL CYST;  Surgeon: Jackson Bernabe MD;  Location: [facility] OR    EXCISE PILONIDAL CYST, SIMPLE Right 07/08/2020    NO HISTORY OF SURGERY         Family History:    Family History   Problem Relation Age of Onset    Diabetes Mother     Hypertension Mother        Social History:  Marital Status:  Single [1]  Social History     Tobacco Use    Smoking status: Former     Current packs/day: 0.50     Average packs/day: 0.5 packs/day for 11.5 years (5.8 ttl pk-yrs)     Types: Cigarettes, Vaping Device     Start date: 2014     Passive exposure: Past    Smokeless tobacco: Former    Tobacco comments:     CBD   Vaping Use    Vaping status: Every Day    Substances: Nicotine, CBD    Devices: Refillable tank, Pre-filled pod    Passive vaping exposure: Yes   Substance Use Topics    Alcohol use: Yes     Comment: 4 a week    Drug use: Not Currently     Frequency: 2.0 times per week     Types: Other     Comment: CBD hemp flower or Gummie        Medications:    ARIPiprazole (ABILIFY) 5 MG tablet  losartan (COZAAR) 100 MG tablet  medical cannabis (Patient's own supply)  SUMAtriptan (IMITREX) 100 MG tablet          Review of Systems   Constitutional:  Negative for chills, diaphoresis and fever.   HENT:  Negative for voice change.    Eyes:  Negative for visual disturbance.   Respiratory:  Negative for cough, chest tightness, shortness of breath and wheezing.    Cardiovascular:  Positive for chest pain and palpitations. Negative for leg swelling.   Gastrointestinal:  Negative for abdominal distention,  "abdominal pain, anal bleeding, blood in stool, nausea and vomiting.   Genitourinary:  Negative for decreased urine volume, dysuria, flank pain and frequency.   Musculoskeletal:  Negative for back pain, gait problem, myalgias and neck pain.   Skin:  Negative for color change, pallor and rash.   Neurological:  Positive for dizziness. Negative for syncope, weakness, light-headedness, numbness and headaches.   Psychiatric/Behavioral:  Negative for confusion, sleep disturbance and suicidal ideas.    All other systems reviewed and are negative.      Physical Exam   BP: (!) 160/93  Pulse: 100  Temp: 98.3  F (36.8  C)  Resp: 20  Height: 177.8 cm (5' 10\")  Weight: (!) 137.9 kg (303 lb 14.5 oz)  SpO2: 96 %      Physical Exam  Vitals and nursing note reviewed.   Constitutional:       Appearance: He is well-developed.   HENT:      Head: Normocephalic and atraumatic.   Eyes:      Conjunctiva/sclera: Conjunctivae normal.      Pupils: Pupils are equal, round, and reactive to light.   Neck:      Thyroid: No thyromegaly.      Vascular: No JVD.      Trachea: No tracheal deviation.   Cardiovascular:      Rate and Rhythm: Normal rate and regular rhythm.      Heart sounds: Normal heart sounds. No murmur heard.     No gallop.   Pulmonary:      Effort: Pulmonary effort is normal. No respiratory distress.      Breath sounds: Normal breath sounds. No stridor. No wheezing or rales.   Chest:      Chest wall: No tenderness.   Abdominal:      General: Bowel sounds are normal. There is no distension.      Palpations: Abdomen is soft. There is no mass.      Tenderness: There is no abdominal tenderness. There is no guarding or rebound.   Musculoskeletal:         General: No tenderness. Normal range of motion.      Cervical back: Normal range of motion and neck supple.   Lymphadenopathy:      Cervical: No cervical adenopathy.   Skin:     General: Skin is warm.      Coloration: Skin is not pale.      Findings: No erythema or rash.   Neurological:    "   Mental Status: He is alert and oriented to person, place, and time.   Psychiatric:         Behavior: Behavior normal.         ED Course        Procedures                  No results found for this or any previous visit (from the past 24 hours).    Medications - No data to display    Assessments & Plan (with Medical Decision Making)   Had chest pain yesterday , resolved  Todaly feels palpitation and dizzy      EKG NSR  Labs reviewed  Trop negative    Already feeling better, D C home, follow-up with [facility]  I have reviewed the nursing notes.    I have reviewed the findings, diagnosis, plan and need for follow up with the patient.        Discharge Medication List as of 7/3/2025  8:53 PM          Final diagnoses:   Dizziness       7/3/2025   [facility]       Romero Holman MD  07/06/25 [id number]

## (undated) DEVICE — SU VICRYL 0 UR-6 27" J603H

## (undated) DEVICE — GLOVE BIOGEL PI INDICATOR 8.0 LF 41680

## (undated) DEVICE — BLADE CLIPPER 4406

## (undated) DEVICE — PACK MAJOR LAPAROTOMY LF SBA15MLFCA

## (undated) DEVICE — DRSG STERI STRIP 1/2X4" R1547

## (undated) DEVICE — ADH LIQUID MASTISOL TOPICAL VIAL 2-3ML 0523-48

## (undated) DEVICE — ESU GROUND PAD ADULT W/CORD E7507

## (undated) DEVICE — SLEEVE COMPRESSION SCD KNEE MED 74022

## (undated) DEVICE — SU VICRYL 3-0 SH 27" UND J416H

## (undated) DEVICE — TRAY DRY SKIN PREP TRAY PREMIUM DYND70661

## (undated) DEVICE — DRSG MEDIPORE 3 1/2X4" 3566

## (undated) DEVICE — DECANTER VIAL 2006S

## (undated) DEVICE — SU MONOCRYL 4-0 PS-2 27" UND Y426H

## (undated) DEVICE — COVER LIGHT HANDLE LT-F02

## (undated) DEVICE — SOL WATER 1500ML

## (undated) DEVICE — GLOVE PROTEXIS POWDER FREE SMT 7.5  2D72PT75X

## (undated) RX ORDER — BUPIVACAINE HYDROCHLORIDE AND EPINEPHRINE 5; 5 MG/ML; UG/ML
INJECTION, SOLUTION EPIDURAL; INTRACAUDAL; PERINEURAL
Status: DISPENSED
Start: 2020-07-08

## (undated) RX ORDER — ONDANSETRON 2 MG/ML
INJECTION INTRAMUSCULAR; INTRAVENOUS
Status: DISPENSED
Start: 2020-07-08

## (undated) RX ORDER — DEXAMETHASONE SODIUM PHOSPHATE 4 MG/ML
INJECTION, SOLUTION INTRA-ARTICULAR; INTRALESIONAL; INTRAMUSCULAR; INTRAVENOUS; SOFT TISSUE
Status: DISPENSED
Start: 2020-07-08

## (undated) RX ORDER — ACETAMINOPHEN 325 MG/1
TABLET ORAL
Status: DISPENSED
Start: 2020-07-08

## (undated) RX ORDER — LIDOCAINE HYDROCHLORIDE 20 MG/ML
INJECTION, SOLUTION EPIDURAL; INFILTRATION; INTRACAUDAL; PERINEURAL
Status: DISPENSED
Start: 2020-07-08

## (undated) RX ORDER — CLINDAMYCIN PHOSPHATE 900 MG/50ML
INJECTION, SOLUTION INTRAVENOUS
Status: DISPENSED
Start: 2020-07-08

## (undated) RX ORDER — LIDOCAINE HYDROCHLORIDE 10 MG/ML
INJECTION, SOLUTION EPIDURAL; INFILTRATION; INTRACAUDAL; PERINEURAL
Status: DISPENSED
Start: 2020-07-08

## (undated) RX ORDER — PROPOFOL 10 MG/ML
INJECTION, EMULSION INTRAVENOUS
Status: DISPENSED
Start: 2020-07-08

## (undated) RX ORDER — KETOROLAC TROMETHAMINE 30 MG/ML
INJECTION, SOLUTION INTRAMUSCULAR; INTRAVENOUS
Status: DISPENSED
Start: 2020-07-08

## (undated) RX ORDER — LIDOCAINE HYDROCHLORIDE AND EPINEPHRINE 10; 10 MG/ML; UG/ML
INJECTION, SOLUTION INFILTRATION; PERINEURAL
Status: DISPENSED
Start: 2020-06-23

## (undated) RX ORDER — FENTANYL CITRATE 50 UG/ML
INJECTION, SOLUTION INTRAMUSCULAR; INTRAVENOUS
Status: DISPENSED
Start: 2020-07-08